# Patient Record
Sex: FEMALE | Race: WHITE | Employment: FULL TIME | URBAN - METROPOLITAN AREA
[De-identification: names, ages, dates, MRNs, and addresses within clinical notes are randomized per-mention and may not be internally consistent; named-entity substitution may affect disease eponyms.]

---

## 2018-09-21 ENCOUNTER — HOSPITAL ENCOUNTER (OUTPATIENT)
Dept: MAMMOGRAPHY | Age: 43
Discharge: HOME | End: 2018-09-23
Payer: COMMERCIAL

## 2018-09-21 DIAGNOSIS — Z12.31 VISIT FOR SCREENING MAMMOGRAM: ICD-10-CM

## 2018-09-21 PROCEDURE — 77063 BREAST TOMOSYNTHESIS BI: CPT

## 2020-01-24 ENCOUNTER — HOSPITAL ENCOUNTER (OUTPATIENT)
Dept: MAMMOGRAPHY | Age: 45
Discharge: HOME | End: 2020-01-26
Payer: COMMERCIAL

## 2020-01-24 PROCEDURE — 77063 BREAST TOMOSYNTHESIS BI: CPT

## 2020-01-28 ENCOUNTER — TELEPHONE (OUTPATIENT)
Dept: GENERAL RADIOLOGY | Age: 45
End: 2020-01-28

## 2023-03-07 LAB
FOLLITROPIN (IU/L) IN SER/PLAS: 47.7 IU/L
THYROTROPIN (MIU/L) IN SER/PLAS BY DETECTION LIMIT <= 0.05 MIU/L: 0.73 MIU/L (ref 0.44–3.98)

## 2024-06-07 ENCOUNTER — OFFICE VISIT (OUTPATIENT)
Dept: OBSTETRICS AND GYNECOLOGY | Facility: CLINIC | Age: 49
End: 2024-06-07
Payer: COMMERCIAL

## 2024-06-07 VITALS
WEIGHT: 104 LBS | HEIGHT: 64 IN | SYSTOLIC BLOOD PRESSURE: 100 MMHG | BODY MASS INDEX: 17.75 KG/M2 | DIASTOLIC BLOOD PRESSURE: 58 MMHG

## 2024-06-07 DIAGNOSIS — Z12.11 COLON CANCER SCREENING: Primary | ICD-10-CM

## 2024-06-07 DIAGNOSIS — Z01.419 WELL WOMAN EXAM: ICD-10-CM

## 2024-06-07 DIAGNOSIS — Z12.31 SCREENING MAMMOGRAM, ENCOUNTER FOR: ICD-10-CM

## 2024-06-07 PROCEDURE — 88175 CYTOPATH C/V AUTO FLUID REDO: CPT

## 2024-06-07 PROCEDURE — 1036F TOBACCO NON-USER: CPT | Performed by: OBSTETRICS & GYNECOLOGY

## 2024-06-07 PROCEDURE — 99396 PREV VISIT EST AGE 40-64: CPT | Performed by: OBSTETRICS & GYNECOLOGY

## 2024-06-07 NOTE — PROGRESS NOTES
"Karen Gay is a 49 y.o. female who is here for a routine exam. PCP = Kiley Stacy MD    Menses : pm  Contraception : other  HPV vaccine : No  Last pap : 2023 normal  Last HPV : 2023 negative  History of abnormal pap : Yes, describe: RACHEL I 2020, ascus/hpv + 2022  Last mammogram : 2023 normal  History of abnormal mammogram : Yes, describe: 2021 follow up normal  Colon cancer screen : never    ROS  systems reviewed, anything negative noted in HPI    bladder: no dysuria, gross hematuria, urinary frequency, urgency or incontinence  breast: no lumps, nipple d/c, overlying skin changes, redness, or skin retraction    [unfilled]    Past med hx and past surg hx reviewed and notable for: none    Objective   /58   Ht 1.626 m (5' 4\")   Wt 47.2 kg (104 lb)   BMI 17.85 kg/m²      General:   Alert and oriented, in no acute distress   Neck: Supple. No visible thyromegaly.    Breast/Axilla: Normal to palpation bilaterally without masses, skin changes, lymphadenopathy, or nipple discharge.    Abdomen: Soft, non-tender, without masses or organomegaly   Vulva: Normal architecture without erythema, masses, or lesions.    Vagina: Normal mucosa without lesions, masses, or atrophy. No abnormal vaginal discharge.    Cervix: Normal without masses, lesions, or signs of cervicitis.    Uterus: Normal mobile, non-enlarged uterus    Adnexa: Normal without masses or lesions   Pelvic Floor No POP noted.    Psych Normal affect. Normal mood.      Thank you for coming to your annual exam. Your findings during the exam were normal. Specific topics addressed during this exam included: healthy lifestyle, well woman screening guidelines,     Actions performed during this visit include:  - Clinical breast exam  - Clinical pelvic exam  - pap  Orders Placed This Encounter   Procedures    BI mammo bilateral screening tomosynthesis    Cologuard® colon cancer screening           Please return for your next visit in 1 year.  "

## 2024-06-20 LAB
CYTOLOGY CMNT CVX/VAG CYTO-IMP: NORMAL
LAB AP HPV GENOTYPE QUESTION: YES
LAB AP HPV HR: NORMAL
LABORATORY COMMENT REPORT: NORMAL
PATH REPORT.TOTAL CANCER: NORMAL

## 2024-06-25 ENCOUNTER — HOSPITAL ENCOUNTER (OUTPATIENT)
Dept: RADIOLOGY | Facility: CLINIC | Age: 49
Discharge: HOME | End: 2024-06-25
Payer: COMMERCIAL

## 2024-06-25 VITALS — WEIGHT: 105 LBS | BODY MASS INDEX: 17.93 KG/M2 | HEIGHT: 64 IN

## 2024-06-25 DIAGNOSIS — Z12.31 SCREENING MAMMOGRAM, ENCOUNTER FOR: ICD-10-CM

## 2024-06-25 PROCEDURE — 77067 SCR MAMMO BI INCL CAD: CPT | Performed by: RADIOLOGY

## 2024-06-25 PROCEDURE — 77067 SCR MAMMO BI INCL CAD: CPT

## 2024-06-25 PROCEDURE — 77063 BREAST TOMOSYNTHESIS BI: CPT | Performed by: RADIOLOGY

## 2025-02-07 ENCOUNTER — APPOINTMENT (OUTPATIENT)
Dept: URGENT CARE | Age: 50
End: 2025-02-07
Payer: COMMERCIAL

## 2025-05-03 ENCOUNTER — HOSPITAL ENCOUNTER (EMERGENCY)
Facility: HOSPITAL | Age: 50
Discharge: HOME | End: 2025-05-03
Payer: COMMERCIAL

## 2025-05-03 ENCOUNTER — APPOINTMENT (OUTPATIENT)
Dept: RADIOLOGY | Facility: HOSPITAL | Age: 50
End: 2025-05-03
Payer: COMMERCIAL

## 2025-05-03 VITALS
SYSTOLIC BLOOD PRESSURE: 120 MMHG | DIASTOLIC BLOOD PRESSURE: 81 MMHG | BODY MASS INDEX: 18.78 KG/M2 | OXYGEN SATURATION: 98 % | RESPIRATION RATE: 18 BRPM | TEMPERATURE: 97.5 F | HEART RATE: 94 BPM | WEIGHT: 110 LBS | HEIGHT: 64 IN

## 2025-05-03 DIAGNOSIS — S52.502A CLOSED FRACTURE OF DISTAL ENDS OF LEFT RADIUS AND ULNA, INITIAL ENCOUNTER: ICD-10-CM

## 2025-05-03 DIAGNOSIS — S52.602A CLOSED FRACTURE OF DISTAL ENDS OF LEFT RADIUS AND ULNA, INITIAL ENCOUNTER: ICD-10-CM

## 2025-05-03 DIAGNOSIS — S52.532A CLOSED COLLES' FRACTURE OF LEFT RADIUS, INITIAL ENCOUNTER: Primary | ICD-10-CM

## 2025-05-03 PROCEDURE — 2500000001 HC RX 250 WO HCPCS SELF ADMINISTERED DRUGS (ALT 637 FOR MEDICARE OP): Performed by: PHYSICIAN ASSISTANT

## 2025-05-03 PROCEDURE — 73110 X-RAY EXAM OF WRIST: CPT | Mod: LT

## 2025-05-03 PROCEDURE — 99283 EMERGENCY DEPT VISIT LOW MDM: CPT

## 2025-05-03 PROCEDURE — 73110 X-RAY EXAM OF WRIST: CPT | Mod: LEFT SIDE | Performed by: STUDENT IN AN ORGANIZED HEALTH CARE EDUCATION/TRAINING PROGRAM

## 2025-05-03 RX ORDER — HYDROCODONE BITARTRATE AND ACETAMINOPHEN 5; 325 MG/1; MG/1
1 TABLET ORAL ONCE
Refills: 0 | Status: COMPLETED | OUTPATIENT
Start: 2025-05-03 | End: 2025-05-03

## 2025-05-03 RX ORDER — HYDROCODONE BITARTRATE AND ACETAMINOPHEN 5; 325 MG/1; MG/1
1 TABLET ORAL EVERY 6 HOURS PRN
Qty: 12 TABLET | Refills: 0 | Status: SHIPPED | OUTPATIENT
Start: 2025-05-03 | End: 2025-05-06

## 2025-05-03 RX ADMIN — HYDROCODONE BITARTRATE AND ACETAMINOPHEN 1 TABLET: 5; 325 TABLET ORAL at 10:51

## 2025-05-03 ASSESSMENT — PAIN DESCRIPTION - DESCRIPTORS: DESCRIPTORS: ACHING

## 2025-05-03 ASSESSMENT — COLUMBIA-SUICIDE SEVERITY RATING SCALE - C-SSRS
6. HAVE YOU EVER DONE ANYTHING, STARTED TO DO ANYTHING, OR PREPARED TO DO ANYTHING TO END YOUR LIFE?: NO
1. IN THE PAST MONTH, HAVE YOU WISHED YOU WERE DEAD OR WISHED YOU COULD GO TO SLEEP AND NOT WAKE UP?: NO
2. HAVE YOU ACTUALLY HAD ANY THOUGHTS OF KILLING YOURSELF?: NO

## 2025-05-03 ASSESSMENT — PAIN SCALES - GENERAL: PAINLEVEL_OUTOF10: 7

## 2025-05-03 ASSESSMENT — PAIN DESCRIPTION - ORIENTATION: ORIENTATION: LEFT

## 2025-05-03 ASSESSMENT — PAIN DESCRIPTION - LOCATION: LOCATION: WRIST

## 2025-05-03 ASSESSMENT — PAIN - FUNCTIONAL ASSESSMENT: PAIN_FUNCTIONAL_ASSESSMENT: 0-10

## 2025-05-06 ENCOUNTER — TELEPHONE (OUTPATIENT)
Dept: ORTHOPEDIC SURGERY | Facility: CLINIC | Age: 50
End: 2025-05-06

## 2025-05-06 ENCOUNTER — APPOINTMENT (OUTPATIENT)
Dept: ORTHOPEDIC SURGERY | Facility: CLINIC | Age: 50
End: 2025-05-06
Payer: COMMERCIAL

## 2025-05-06 DIAGNOSIS — S52.602A CLOSED FRACTURE OF DISTAL ENDS OF LEFT RADIUS AND ULNA, INITIAL ENCOUNTER: ICD-10-CM

## 2025-05-06 DIAGNOSIS — S52.502A CLOSED FRACTURE OF DISTAL ENDS OF LEFT RADIUS AND ULNA, INITIAL ENCOUNTER: ICD-10-CM

## 2025-05-06 PROCEDURE — 99204 OFFICE O/P NEW MOD 45 MIN: CPT | Performed by: ORTHOPAEDIC SURGERY

## 2025-05-06 PROCEDURE — 99214 OFFICE O/P EST MOD 30 MIN: CPT | Mod: 57 | Performed by: ORTHOPAEDIC SURGERY

## 2025-05-07 ENCOUNTER — TELEPHONE (OUTPATIENT)
Dept: ORTHOPEDIC SURGERY | Facility: CLINIC | Age: 50
End: 2025-05-07
Payer: COMMERCIAL

## 2025-05-07 DIAGNOSIS — M25.539 PAIN IN WRIST, UNSPECIFIED LATERALITY: Primary | ICD-10-CM

## 2025-05-07 RX ORDER — OXYCODONE AND ACETAMINOPHEN 5; 325 MG/1; MG/1
1 TABLET ORAL EVERY 4 HOURS PRN
Qty: 28 TABLET | Refills: 0 | Status: SHIPPED | OUTPATIENT
Start: 2025-05-07 | End: 2025-05-14

## 2025-05-07 NOTE — PROGRESS NOTES
Patient presents with a left wrist fracture she fell the other day and placed in a splint she having moderate discomfort no numbness or tingling she is in good general health  See intake sheet which was reviewed and scanned into the chart  Constitutional: Well-developed well-nourished   Eyes: Sclerae anicteric, pupils equal and round  HENT: Normocephalic atraumatic  Cardiovascular: Pulses full, regular rate and rhythm  Respiratory: Breathing not labored, no wheezing  Integumentary: Skin intact, no lesions or rashes  Neurological: Sensation intact, no gross strength deficits, reflexes equal  Psychiatric: Alert oriented and appropriate  Hematologic/lymphatic: No lymphadenopathy  Left wrist appropriately immobilized digits are mobile and sensate with satisfactory capillary refill  X-rays are personally interpreted comminuted distal radius fracture which is dorsally angulated      Impression distal radius fracture  Plan we discussed treatment options closed treatment versus open reduction internal fixation given her age and fracture position I recommend proceeding with open duction internal fixation  Risks, benefits, goals of surgery as well as alternative treatments were discussed.   Realistic post operative expectations and expected course of recovery were reviewed.  All questions answered.

## 2025-05-07 NOTE — TELEPHONE ENCOUNTER
This patient sent a Koru message and she is ready to schedule her surgery.  Can we place a Case request for surgery for this patient?

## 2025-05-08 ENCOUNTER — TELEPHONE (OUTPATIENT)
Dept: PREADMISSION TESTING | Facility: HOSPITAL | Age: 50
End: 2025-05-08
Payer: COMMERCIAL

## 2025-05-08 PROBLEM — S52.602A CLOSED FRACTURE OF LEFT DISTAL RADIUS AND ULNA: Status: ACTIVE | Noted: 2025-05-06

## 2025-05-08 PROBLEM — S52.502A CLOSED FRACTURE OF LEFT DISTAL RADIUS AND ULNA: Status: ACTIVE | Noted: 2025-05-06

## 2025-05-09 ENCOUNTER — APPOINTMENT (OUTPATIENT)
Dept: LAB | Facility: HOSPITAL | Age: 50
End: 2025-05-09
Payer: COMMERCIAL

## 2025-05-09 ENCOUNTER — CLINICAL SUPPORT (OUTPATIENT)
Dept: PREADMISSION TESTING | Facility: HOSPITAL | Age: 50
End: 2025-05-09
Payer: COMMERCIAL

## 2025-05-09 ENCOUNTER — PRE-ADMISSION TESTING (OUTPATIENT)
Dept: PREADMISSION TESTING | Facility: HOSPITAL | Age: 50
End: 2025-05-09
Payer: COMMERCIAL

## 2025-05-09 VITALS
RESPIRATION RATE: 18 BRPM | TEMPERATURE: 97 F | DIASTOLIC BLOOD PRESSURE: 75 MMHG | OXYGEN SATURATION: 94 % | HEART RATE: 80 BPM | WEIGHT: 108.03 LBS | SYSTOLIC BLOOD PRESSURE: 119 MMHG | BODY MASS INDEX: 18.44 KG/M2 | HEIGHT: 64 IN

## 2025-05-09 DIAGNOSIS — Z01.818 PREOP TESTING: Primary | ICD-10-CM

## 2025-05-09 LAB
ANION GAP SERPL CALC-SCNC: 13 MMOL/L (ref 10–20)
BASOPHILS # BLD AUTO: 0.03 X10*3/UL (ref 0–0.1)
BASOPHILS NFR BLD AUTO: 0.6 %
BUN SERPL-MCNC: 15 MG/DL (ref 6–23)
CALCIUM SERPL-MCNC: 10.5 MG/DL (ref 8.6–10.3)
CHLORIDE SERPL-SCNC: 83 MMOL/L (ref 98–107)
CO2 SERPL-SCNC: 39 MMOL/L (ref 21–32)
CREAT SERPL-MCNC: 0.84 MG/DL (ref 0.5–1.05)
EGFRCR SERPLBLD CKD-EPI 2021: 86 ML/MIN/1.73M*2
EOSINOPHIL # BLD AUTO: 0.01 X10*3/UL (ref 0–0.7)
EOSINOPHIL NFR BLD AUTO: 0.2 %
ERYTHROCYTE [DISTWIDTH] IN BLOOD BY AUTOMATED COUNT: 11.1 % (ref 11.5–14.5)
GLUCOSE SERPL-MCNC: 116 MG/DL (ref 74–99)
HCT VFR BLD AUTO: 39.5 % (ref 36–46)
HGB BLD-MCNC: 14.3 G/DL (ref 12–16)
IMM GRANULOCYTES # BLD AUTO: 0.01 X10*3/UL (ref 0–0.7)
IMM GRANULOCYTES NFR BLD AUTO: 0.2 % (ref 0–0.9)
LYMPHOCYTES # BLD AUTO: 1.96 X10*3/UL (ref 1.2–4.8)
LYMPHOCYTES NFR BLD AUTO: 36.8 %
MCH RBC QN AUTO: 33.2 PG (ref 26–34)
MCHC RBC AUTO-ENTMCNC: 36.2 G/DL (ref 32–36)
MCV RBC AUTO: 92 FL (ref 80–100)
MONOCYTES # BLD AUTO: 0.74 X10*3/UL (ref 0.1–1)
MONOCYTES NFR BLD AUTO: 13.9 %
NEUTROPHILS # BLD AUTO: 2.57 X10*3/UL (ref 1.2–7.7)
NEUTROPHILS NFR BLD AUTO: 48.3 %
NRBC BLD-RTO: 0 /100 WBCS (ref 0–0)
PLATELET # BLD AUTO: 290 X10*3/UL (ref 150–450)
POTASSIUM SERPL-SCNC: 3.4 MMOL/L (ref 3.5–5.3)
RBC # BLD AUTO: 4.31 X10*6/UL (ref 4–5.2)
SODIUM SERPL-SCNC: 132 MMOL/L (ref 136–145)
WBC # BLD AUTO: 5.3 X10*3/UL (ref 4.4–11.3)

## 2025-05-09 PROCEDURE — 85025 COMPLETE CBC W/AUTO DIFF WBC: CPT

## 2025-05-09 PROCEDURE — 87081 CULTURE SCREEN ONLY: CPT | Mod: AHULAB | Performed by: PHYSICIAN ASSISTANT

## 2025-05-09 PROCEDURE — 80048 BASIC METABOLIC PNL TOTAL CA: CPT

## 2025-05-09 PROCEDURE — 99204 OFFICE O/P NEW MOD 45 MIN: CPT | Performed by: PHYSICIAN ASSISTANT

## 2025-05-09 RX ORDER — BISMUTH SUBSALICYLATE 262 MG
1 TABLET,CHEWABLE ORAL DAILY
COMMUNITY

## 2025-05-09 RX ORDER — NICOTINE POLACRILEX 2 MG
1 GUM BUCCAL DAILY
COMMUNITY

## 2025-05-09 RX ORDER — CHLORHEXIDINE GLUCONATE ORAL RINSE 1.2 MG/ML
SOLUTION DENTAL
Qty: 473 ML | Refills: 0 | Status: SHIPPED | OUTPATIENT
Start: 2025-05-09

## 2025-05-09 ASSESSMENT — ENCOUNTER SYMPTOMS
ENDOCRINE NEGATIVE: 1
CONSTITUTIONAL NEGATIVE: 1
RESPIRATORY NEGATIVE: 1
CARDIOVASCULAR NEGATIVE: 1
PSYCHIATRIC NEGATIVE: 1
MUSCULOSKELETAL NEGATIVE: 1
ALLERGIC/IMMUNOLOGIC NEGATIVE: 1
NEUROLOGICAL NEGATIVE: 1
EYES NEGATIVE: 1
GASTROINTESTINAL NEGATIVE: 1
HEMATOLOGIC/LYMPHATIC NEGATIVE: 1

## 2025-05-09 NOTE — CPM/PAT H&P
[facility] Evaluation       Name: Karen Gay (Karen Gay)  /Age: 1975/49 y.o.     In-Person       Chief Complaint: Closed fracture of distal ends of left radius and ulna, initial encounter [S52.502A, S52.602A]     HPI      Date of Consult: 25    Referring Provider: Dr. Arthur     Surgery, Date, and Length: Left Wrist Fracture Open Reduction Internal Fixation; 25; 90 minutes     Karen Gay is a 49 year-old female who [facility] perioperative risk assessment prior to surgery. She was walking the dog this was mechanical fall  injuring wrist.   Xray 5/3: 1. Acute comminuted impacted and angulated fracture through distal  radial metadiaphysis with findings concerning for intra-articular  extension to the distal radial articular surface as described above.  2. Minimally displaced fracture through base of the ulnar styloid process.  3. Diffuse soft tissue swelling about the wrist.      This note was created in part upon personal review of patient's medical records.      Patient is scheduled to have Left Wrist Fracture Open Reduction Internal Fixation     Medical History  Past Medical History:   Diagnosis Date    H/O hypokalemia             Surgical History  Past Surgical History:   Procedure Laterality Date    COLPOSCOPY               Family history:  Family History   Problem Relation Name Age of Onset    No Known Problems Mother      Heart attack Father      Prostate cancer Father      No Known Problems Sister      Breast cancer Father's Sister          Social history:  Social History     Socioeconomic History    Marital status: Single     Spouse name: Not on file    Number of children: Not on file    Years of education: Not on file    Highest education level: Not on file   Occupational History    Not on file   Tobacco Use    Smoking status: Never    Smokeless tobacco: Never   Vaping Use    Vaping status: Never Used   Substance and Sexual Activity    Alcohol use: Yes      "Alcohol/week: 8.0 standard drinks of alcohol     Types: 8 Standard drinks or equivalent per week    Drug use: Never    Sexual activity: Not on file   Other Topics Concern    Not on file   Social History Narrative    Not on file     Social Drivers of Health     Financial Resource Strain: Not on file   Food Insecurity: Not on file   Transportation Needs: Not on file   Physical Activity: Not on file   Stress: Not on file   Social Connections: Not on file   Intimate Partner Violence: Not on file   Housing Stability: Not on file        Current Outpatient Medications   Medication Instructions    biotin 1 mg, oral, Daily    chlorhexidine (Peridex) 0.12 % solution 15 ml swish and spit for 30 seconds night prior to surgery and morning of surgery    multivitamin tablet 1 tablet, Daily    oxyCODONE-acetaminophen (Percocet) 5-325 mg tablet 1 tablet, oral, Every 4 hours PRN          Visit Vitals  /75   Pulse 80   Temp 36.1 °C (97 °F)   Resp 18   Ht 1.62 m (5' 3.78\")   Wt 49 kg (108 lb 0.4 oz)   SpO2 94%   BMI 18.67 kg/m²   OB Status Postmenopausal   Smoking Status Never   BSA 1.48 m²           Review of Systems   Constitutional: Negative.    HENT: Negative.     Eyes: Negative.         Glasses   Respiratory: Negative.     Cardiovascular: Negative.         METS >4   regular exercise /  Without chest pain / SOB    Gastrointestinal: Negative.    Endocrine: Negative.    Genitourinary: Negative.    Musculoskeletal: Negative.         Left wrist fx   Skin: Negative.    Allergic/Immunologic: Negative.    Neurological: Negative.    Hematological: Negative.    Psychiatric/Behavioral: Negative.          Physical Exam  Vitals reviewed.   Constitutional:       Appearance: Normal appearance.   HENT:      Head: Normocephalic and atraumatic.      Right Ear: External ear normal.      Left Ear: External ear normal.      Nose: Nose normal.      Mouth/Throat:      Pharynx: Oropharynx is clear.   Eyes:      Extraocular Movements: " Extraocular movements intact.      Conjunctiva/sclera: Conjunctivae normal.      Pupils: Pupils are equal, round, and reactive to light.   Cardiovascular:      Rate and Rhythm: Normal rate and regular rhythm.      Heart sounds: Normal heart sounds.   Pulmonary:      Effort: Pulmonary effort is normal.      Breath sounds: Normal breath sounds.   Abdominal:      Palpations: Abdomen is soft.   Musculoskeletal:         General: Normal range of motion.      Cervical back: Normal range of motion and neck supple.      Comments: Left wrist splint    Skin:     General: Skin is warm and dry.   Neurological:      General: No focal deficit present.      Mental Status: She is alert and oriented to person, place, and time.   Psychiatric:         Mood and Affect: Mood normal.         Behavior: Behavior normal.          PAT AIRWAY:   Airway:     Mallampati::  I    Neck ROM::  Full    Lab Results   Component Value Date    WBC 5.3 05/09/2025    HGB 14.3 05/09/2025    HCT 39.5 05/09/2025    MCV 92 05/09/2025     05/09/2025      Lab Results   Component Value Date    GLUCOSE 116 (H) 05/09/2025    CALCIUM 10.5 (H) 05/09/2025     (L) 05/09/2025    K 3.4 (L) 05/09/2025    CO2 39 (H) 05/09/2025    CL 83 (L) 05/09/2025    BUN 15 05/09/2025    CREATININE 0.84 05/09/2025        Patient is scheduled to have Left Wrist Fracture Open Reduction Internal Fixation     Cardiovascular  METS: >4   RCRI: 0  , 3.9 % Risk of MACE  Caprini: 3    Pulmonary:  Stop Bang score is 0 placing patient at low risk for VINEET  ARISCAT: <26 points, 1.6% risk of in-hospital postoperative pulmonary complication  PRODIGY: Low risk for opioid induced respiratory depression     Hematology       Patient instructed to ambulate as soon as possible postoperatively to decrease thromboembolic risk.      Initiate mechanical DVT prophylaxis as soon as possible and initiate chemical prophylaxis when deemed safe from a bleeding standpoint post surgery.       VTE  prophylaxis per surgical team       Tests ordered in PAT: cbc, bmp, mrsa   LABS REVIEWED: unremarkable     Risk assessment complete.  Patient is scheduled for a low surgical risk procedure.        Preoperative medication instructions were provided and reviewed with the patient.  Any additional testing or evaluation was explained to the patient.  Nothing by mouth instructions were discussed and patient's questions were answered prior to conclusion to this encounter.  Patient verbalized understanding of preoperative instructions given in preadmission testing; discharge instructions available in EMR.

## 2025-05-09 NOTE — CPM/PAT NURSE NOTE
CPM/PAT Nurse Note      Name: Karen Gay (Karen Gay)  /Age: 1975/49 y.o.       Medical History[1]    Surgical History[2]    Patient  has no history on file for sexual activity.    Family History[3]    Allergies[4]    Prior to Admission medications    Medication Sig Start Date End Date Taking? Authorizing Provider   biotin 1 mg capsule Take 1 capsule (1 mg) by mouth once daily.    Historical Provider, MD   HYDROcodone-acetaminophen (Norco) 5-325 mg tablet Take 1 tablet by mouth every 6 hours if needed for severe pain (7 - 10) for up to 3 days. 5/3/25 5/6/25  Humza Uribe PA-C   oxyCODONE-acetaminophen (Percocet) 5-325 mg tablet Take 1 tablet by mouth every 4 hours if needed for severe pain (7 - 10) for up to 7 days. 25  MD KANDY Santamaria ROS     DASI Risk Score    No data to display       Caprini DVT Assessment    No data to display       Modified Frailty Index    No data to display       [id number] Stroke Risk Points  Current as of just now        N/A 0 to 9 Points:      Last Change: N/A          The IIX4MP9-OTNw risk score (Lip GH, et al. 2009. © 2010 [employer]) quantifies the risk of stroke for a patient with atrial fibrillation. For patients without atrial fibrillation or under the age of 18 this score appears as N/A. Higher score values generally indicate higher risk of stroke.        This score is not applicable to this patient. Components are not calculated.          Revised Cardiac Risk Index    No data to display       Apfel Simplified Score    No data to display       Risk Analysis Index Results This Encounter    No data found in the last 10 encounters.       Prodigy: High Risk  Total Score: 3              Prodigy Previous Opioid Use Score           ARISCAT Score for Postoperative Pulmonary Complications    No data to display       Salvador Perioperative Risk for Myocardial Infarction or Cardiac Arrest (JOANNA)    No data to display          Nurse Plan of Action:   RN screening call complete.  Reviewed allergies, medications and pharmacy, medical, surgical and social history with patient.  Chart updated.               [1]   Past Medical History:  Diagnosis Date    Personal history of other endocrine, nutritional and metabolic disease 11/23/2016    History of hypokalemia   [2]   Past Surgical History:  Procedure Laterality Date    OTHER SURGICAL HISTORY  08/17/2021    Colposcopy   [3]   Family History  Problem Relation Name Age of Onset    No Known Problems Mother      Heart attack Father      Prostate cancer Father      No Known Problems Sister      Breast cancer Father's Sister     [4]   Allergies  Allergen Reactions    Pseudoephedrine Hcl Other     Causes anxiety

## 2025-05-09 NOTE — PREPROCEDURE INSTRUCTIONS
Medication List            Accurate as of May 9, 2025  2:04 PM. Always use your most recent med list.                biotin 1 mg capsule  Medication Adjustments for Surgery: Do Not take on the morning of surgery     multivitamin tablet  Notes to patient: HOLD 7 Days prior to surgery - Last dose 5/5 Stop NOW if not already stopped      oxyCODONE-acetaminophen 5-325 mg tablet  Commonly known as: Percocet  Take 1 tablet by mouth every 4 hours if needed for severe pain (7 - 10) for up to 7 days.  Medication Adjustments for Surgery: Take/Use as prescribed                 Concerning above medication instructions - If medication is normally taken at night continue normal schedule - do not take night prior and morning of surgery.       Preoperative Deep Breathing Exercises  Why it is important to do deep breathing exercises before my surgery?  Deep breathing exercises strengthen your breathing muscles.  This helps you to recover after your surgery and decreases the chance of breathing complications.  How are the deep breathing exercises done?  Sit straight with your back supported.  Breathe in deeply and slowly through your nose. Your lower rib cage should expand and your abdomen may move forward.  Hold that breath for 3 to 5 seconds.  Breathe out through pursed lips, slowly and completely.  Rest and repeat 10 times every hour while awake.  Rest longer if you become dizzy or lightheaded.      CONTACT SURGEON'S OFFICE IF YOU DEVELOP:  * Fever = 100.4 F   * New respiratory symptoms (e.g. cough, shortness of breath, respiratory distress, sore throat)  * Recent loss of taste or smell  *Flu like symptoms such as headache, fatigue or gastrointestinal symptoms  * You develop any open sores, shingles, burning or painful urination   AND/OR:  * You no longer wish to have the surgery.  * Any other personal circumstances change that may lead to the need to cancel or defer this surgery.  *You were admitted to any hospital within one  week of your planned procedure.    SMOKING:  *Quitting smoking can make a huge difference to your health and recovery from surgery.    *If you need help with quitting, call 4-022-QUIT-NOW.    THE DAY OF SURGERY:  *Do not eat any food after midnight the night before surgery/procedure.   *YOU MUST DRINK 14 oz drink clear liquids (i.e. water, black coffee/tea, (no milk or cream) apple juice, and electrolyte drinks (Gatorade)  2 hours before your instructed arrival time to the hospital.  *You may chew gum until  2 hours before your surgery/procedure.    SURGICAL TIME  *You will be contacted between 2 p.m. and 6 p.m. the business day before your surgery with your arrival time.  *If you haven't received a call by 6pm, call [phone].  *Scheduled surgery times may change and you will be notified if this occurs-check your personal voicemail for any updates.    ON THE MORNING OF SURGERY:  *Wear comfortable, loose fitting clothing.   *Do not use moisturizers, creams, lotions or perfume.  *All jewelry and valuables should be left at home.  *Prosthetic devices such as contact lenses, hearing aids, dentures, eyelash extensions, hairpins and body piercing must be removed before surgery.    BRING WITH YOU:  *Photo ID and insurance card  *Current list of medicines and allergies  *Pacemaker/Defibrillator/Heart stent cards  *CPAP machine and mask  *Slings/splints/crutches  *Copy of your complete Advanced Directive/DHPOA-if applicable  *Neurostimulator implant remote    PARKING AND ARRIVAL:  *Check in at the Main Entrance desk and let them know you are here for surgery.  *You will be directed to the 2nd floor surgical waiting area.    AFTER OUTPATIENT SURGERY:  *A responsible adult MUST accompany you at the time of discharge and stay with you for 24 hours after your surgery.  *You may NOT drive yourself home after surgery.  *You may use a taxi or ride sharing service (Lyft, Uber) to return home ONLY if you are accompanied by a  friend or family member.  *Instructions for resuming your medications will be provided by your surgeon.      Home Preoperative Antibacterial Shower     What is a home preoperative antibacterial shower?  This shower is a way of cleaning the skin with a germ killing soap before surgery.  The soap contains chlorhexidine, commonly known as CHG.  CHG is a soap for your skin with germ killing ability.  Let your doctor know if you are allergic to chlorhexidine.    Why do I need to take a preoperative antibacterial shower?  Skin is not sterile.  It is best to try to make your skin as free of germs as possible before surgery.  Proper cleansing with a germ killing soap before surgery can lower the number of germs on your skin.  This helps to reduce the risk of infection at the surgical site.  Following the instructions listed below will help you prepare your skin for surgery.      How do I use the CHG skin cleanser?  Steps:  Begin using your CHG soap five days before your scheduled surgery on ________________________.    Days 1-4 Shower before bed:  Wash your face and genitals with your normal soap and rinse.           2.    Apply the CHG soap to a clean wet washcloth.  Turn the water off or move away                From the water spray to avoid premature rinsing of the CHG soap as you are applying.     3.   Lather your entire body from the neck down.  Do not use on your face or genitals.  4. Pay special attention to the area(s) where your incision(s) will be located unless they are on your face.  Avoid scrubbing your skin too hard.  The important point is to have the CHG soap sit on your skin for 3 minutes.    When the 3 minutes are up, turn on the water and rinse the CHG soap off your body completely.   Pat yourself dry with a clean, freshly-laundered towel.  Dress in clean, freshly laundered night clothes.    Be sure to change bed sheets and blankets at least on the first night of CHG body wash use. May change linens every  night of the above protocol for maximum benefit.   Day 5:  Last shower is the morning of surgery: Follow above Instructions.    NOTE:        *Keep CHG soap out of eyes and ear canals   *DO NOT wash with regular soap on your body after you have used the CHG soap solution  *DO NOT apply powders, lotions, or perfume.  *Deodorant may be used days 1-4, BUT NOT the day of surgery    Who should I contact if I have any questions regarding the use of CHG soap?  Call the [facility], Preadmission Testing at [phone] if you have any questions.              Patient Information: Pre-Operative Infection Prevention Measures     Why did I have my nose, under my arms and groin swabbed?  The purpose of the swab is to identify Staphylococcus aureus inside your nose or on your skin.  The swab was sent to the laboratory for culture.  A positive swab/culture for Staphylococcus aureus is called colonization or carriage.      What is Staphylococcus aureus?  Staphylococcus aureus, also known as “staph”, is a germ found on the skin or in the nose of healthy people.  Sometimes Staphylococcus aureus can get into the body and cause an infection.  This can be minor (such as pimples, boils or other skin problems).  It might also be serious (such as blood infection, pneumonia or a surgical site infection).    What is Staphylococcus aureus colonization or carriage?  Colonization or carriage means that a person has the germ but is not sick from it.  These bacteria can be spread on the hands or when breathing or sneezing.    How is Staphylococcus aureus spread?  It is most often spread by close contact with a person or item that carries it.    What happens if my culture is positive for Staphylococcus aureus?  Your doctor/medical team will use this information to guide any antibiotic treatment which may be necessary.  Regardless of the culture results, we will clean the inside of your nose with a betadine swab just  before you have your surgery.      Will I get an infection if I have Staphylococcus aureus in my nose or on my skin?  Anyone can get an infection with Staphylococcus aureus.  However, the best way to reduce your risk of infection is to follow the instructions provided to you for the use of your CHG soap and dental rinse.        Who should I contact if I have any questions?  Call the [facility], Preadmission Testing at [phone] if you have any questions.          Patient Information: Oral/Dental Rinse  **This is a prescription; pick it up at your preferred local pharmacy **  What is oral/dental rinse?   It is a mouthwash. It is a way of cleaning the mouth with a germ killing solution before your surgery.  The solution contains chlorhexidine, commonly known as CHG.   It is used inside the mouth to kill a bacteria known as Staphylococcus aureus.  Let your doctor know if you are allergic to Chlorhexidine.    Why do I need to use CHG oral/dental rinse?  The CHG oral/dental rinse helps to kill a bacteria in your mouth known a Staphylococcus aureus.     This reduces the risk of infection at the surgical site.      Using your CHG oral/dental rinse  STEPS:  Use your CHG oral/dental rinse after you brush your teeth the night before (at bedtime) and the morning of your surgery.  Follow all directions on your prescription label.    Use the cap on the container to measure 15ml (fill cap to fill line)  Swish (gargle if you can) the mouthwash in your mouth for at least 30 seconds, (do not to swallow) spit out  After you use your CHG rinse, do not rinse your mouth with water, drink or eat.  Please refer to prescription label for the appropriate time to resume oral intake  Dental rinse comes in one size bottle: 473ml ~16oz.  You will have leftover    rinse, discard after this use.    What side effects might I have using the CHG oral/dental rinse?  CHG rinse will stick to plaque on the teeth.  Brush  and floss just before use.  Teeth brushing will help avoid staining of plaque during use.    Who should I contact if I have questions about the CHG oral/dental rinse?  Please call [facility], Preadmission Testing at [phone] if you have any questions           Patient Information: Oral/Dental Rinse  **This is a prescription; pick it up at your preferred local pharmacy **  What is oral/dental rinse?   It is a mouthwash. It is a way of cleaning the mouth with a germ killing solution before your surgery.  The solution contains chlorhexidine, commonly known as CHG.   It is used inside the mouth to kill a bacteria known as Staphylococcus aureus.  Let your doctor know if you are allergic to Chlorhexidine.    Why do I need to use CHG oral/dental rinse?  The CHG oral/dental rinse helps to kill a bacteria in your mouth known a Staphylococcus aureus.     This reduces the risk of infection at the surgical site.      Using your CHG oral/dental rinse  STEPS:  Use your CHG oral/dental rinse after you brush your teeth the night before (at bedtime) and the morning of your surgery.  Follow all directions on your prescription label.    Use the cap on the container to measure 15ml (fill cap to fill line)  Swish (gargle if you can) the mouthwash in your mouth for at least 30 seconds, (do not to swallow) spit out  After you use your CHG rinse, do not rinse your mouth with water, drink or eat.  Please refer to prescription label for the appropriate time to resume oral intake  Dental rinse comes in one size bottle: 473ml ~16oz.  You will have leftover    rinse, discard after this use.    What side effects might I have using the CHG oral/dental rinse?  CHG rinse will stick to plaque on the teeth.  Brush and floss just before use.  Teeth brushing will help avoid staining of plaque during use.    Who should I contact if I have questions about the CHG oral/dental rinse?  Please call [facility]  [facility], Preadmission Testing at [phone] if you have any questions

## 2025-05-09 NOTE — H&P (VIEW-ONLY)
[facility]       Name: Karen Gay (Karen Gay)  /Age: 1975/49 y.o.     In-Person       Chief Complaint: Closed fracture of distal ends of left radius and ulna, initial encounter [S52.502A, S52.602A]     HPI      Date of Consult: 25    Referring Provider: Dr. Arthur     Surgery, Date, and Length: Left Wrist Fracture Open Reduction Internal Fixation; 25; 90 minutes     Karen Gay is a 49 year-old female who presents to the [facility] for perioperative risk assessment prior to surgery. She was walking the dog this was mechanical fall  injuring wrist.   Xray 5/3: 1. Acute comminuted impacted and angulated fracture through distal  radial metadiaphysis with findings concerning for intra-articular  extension to the distal radial articular surface as described above.  2. Minimally displaced fracture through base of the ulnar styloid process.  3. Diffuse soft tissue swelling about the wrist.      This note was created in part upon personal review of patient's medical records.      Patient is scheduled to have Left Wrist Fracture Open Reduction Internal Fixation     Medical History  Past Medical History:   Diagnosis Date    H/O hypokalemia             Surgical History  Past Surgical History:   Procedure Laterality Date    COLPOSCOPY               Family history:  Family History   Problem Relation Name Age of Onset    No Known Problems Mother      Heart attack Father      Prostate cancer Father      No Known Problems Sister      Breast cancer Father's Sister          Social history:  Social History     Socioeconomic History    Marital status: Single     Spouse name: Not on file    Number of children: Not on file    Years of education: Not on file    Highest education level: Not on file   Occupational History    Not on file   Tobacco Use    Smoking status: Never    Smokeless tobacco: Never   Vaping Use    Vaping status: Never Used   Substance and Sexual Activity    Alcohol use: Yes      "Alcohol/week: 8.0 standard drinks of alcohol     Types: 8 Standard drinks or equivalent per week    Drug use: Never    Sexual activity: Not on file   Other Topics Concern    Not on file   Social History Narrative    Not on file     Social Drivers of Health     Financial Resource Strain: Not on file   Food Insecurity: Not on file   Transportation Needs: Not on file   Physical Activity: Not on file   Stress: Not on file   Social Connections: Not on file   Intimate Partner Violence: Not on file   Housing Stability: Not on file        Current Outpatient Medications   Medication Instructions    biotin 1 mg, oral, Daily    chlorhexidine (Peridex) 0.12 % solution 15 ml swish and spit for 30 seconds night prior to surgery and morning of surgery    multivitamin tablet 1 tablet, Daily    oxyCODONE-acetaminophen (Percocet) 5-325 mg tablet 1 tablet, oral, Every 4 hours PRN          Visit Vitals  /75   Pulse 80   Temp 36.1 °C (97 °F)   Resp 18   Ht 1.62 m (5' 3.78\")   Wt 49 kg (108 lb 0.4 oz)   SpO2 94%   BMI 18.67 kg/m²   OB Status Postmenopausal   Smoking Status Never   BSA 1.48 m²           Review of Systems   Constitutional: Negative.    HENT: Negative.     Eyes: Negative.         Glasses   Respiratory: Negative.     Cardiovascular: Negative.         METS >4   regular exercise /  Without chest pain / SOB    Gastrointestinal: Negative.    Endocrine: Negative.    Genitourinary: Negative.    Musculoskeletal: Negative.         Left wrist fx   Skin: Negative.    Allergic/Immunologic: Negative.    Neurological: Negative.    Hematological: Negative.    Psychiatric/Behavioral: Negative.          Physical Exam  Vitals reviewed.   Constitutional:       Appearance: Normal appearance.   HENT:      Head: Normocephalic and atraumatic.      Right Ear: External ear normal.      Left Ear: External ear normal.      Nose: Nose normal.      Mouth/Throat:      Pharynx: Oropharynx is clear.   Eyes:      Extraocular Movements: " Extraocular movements intact.      Conjunctiva/sclera: Conjunctivae normal.      Pupils: Pupils are equal, round, and reactive to light.   Cardiovascular:      Rate and Rhythm: Normal rate and regular rhythm.      Heart sounds: Normal heart sounds.   Pulmonary:      Effort: Pulmonary effort is normal.      Breath sounds: Normal breath sounds.   Abdominal:      Palpations: Abdomen is soft.   Musculoskeletal:         General: Normal range of motion.      Cervical back: Normal range of motion and neck supple.      Comments: Left wrist splint    Skin:     General: Skin is warm and dry.   Neurological:      General: No focal deficit present.      Mental Status: She is alert and oriented to person, place, and time.   Psychiatric:         Mood and Affect: Mood normal.         Behavior: Behavior normal.          PAT AIRWAY:   Airway:     Mallampati::  I    Neck ROM::  Full    Lab Results   Component Value Date    WBC 5.3 05/09/2025    HGB 14.3 05/09/2025    HCT 39.5 05/09/2025    MCV 92 05/09/2025     05/09/2025      Lab Results   Component Value Date    GLUCOSE 116 (H) 05/09/2025    CALCIUM 10.5 (H) 05/09/2025     (L) 05/09/2025    K 3.4 (L) 05/09/2025    CO2 39 (H) 05/09/2025    CL 83 (L) 05/09/2025    BUN 15 05/09/2025    CREATININE 0.84 05/09/2025        Patient is scheduled to have Left Wrist Fracture Open Reduction Internal Fixation     Cardiovascular  METS: >4   RCRI: 0  , 3.9 % Risk of MACE  Caprini: 3    Pulmonary:  Stop Bang score is 0 placing patient at low risk for VINEET  ARISCAT: <26 points, 1.6% risk of in-hospital postoperative pulmonary complication  PRODIGY: Low risk for opioid induced respiratory depression     Hematology       Patient instructed to ambulate as soon as possible postoperatively to decrease thromboembolic risk.      Initiate mechanical DVT prophylaxis as soon as possible and initiate chemical prophylaxis when deemed safe from a bleeding standpoint post surgery.       VTE  prophylaxis per surgical team       Tests ordered in PAT: cbc, bmp, mrsa   LABS REVIEWED: unremarkable     Risk assessment complete.  Patient is scheduled for a low surgical risk procedure.        Preoperative medication instructions were provided and reviewed with the patient.  Any additional testing or evaluation was explained to the patient.  Nothing by mouth instructions were discussed and patient's questions were answered prior to conclusion to this encounter.  Patient verbalized understanding of preoperative instructions given in preadmission testing; discharge instructions available in EMR.

## 2025-05-09 NOTE — CPM/PAT NURSE NOTE
CPM/PAT Nurse Note      Name: Karen Gay (Karen Gay)  /Age: 1975/49 y.o.       Medical History[1]    Surgical History[2]    Patient  has no history on file for sexual activity.    Family History[3]    Allergies[4]    Prior to Admission medications    Medication Sig Start Date End Date Taking? Authorizing Provider   multivitamin tablet Take 1 tablet by mouth once daily.   Yes Historical Provider, MD   oxyCODONE-acetaminophen (Percocet) 5-325 mg tablet Take 1 tablet by mouth every 4 hours if needed for severe pain (7 - 10) for up to 7 days. 25 Yes Emanuel Arthur MD   biotin 1 mg capsule Take 1 capsule (1 mg) by mouth once daily.    Historical Provider, MD   chlorhexidine (Peridex) 0.12 % solution 15 ml swish and spit for 30 seconds night prior to surgery and morning of surgery 25   Karli Chandra PA-C   HYDROcodone-acetaminophen (Norco) 5-325 mg tablet Take 1 tablet by mouth every 6 hours if needed for severe pain (7 - 10) for up to 3 days.  Patient not taking: Reported on 2025 5/3/25 5/6/25  DESTINEY Ramirez     DASI Risk Score    No data to display       Caprini DVT Assessment    No data to display       Modified Frailty Index    No data to display       [id number] Stroke Risk Points  Current as of just now        N/A 0 to 9 Points:      Last Change: N/A          The ENC8WW5-VWVk risk score (Lip RAUL, et al. 2009. © 2010 [employer]) quantifies the risk of stroke for a patient with atrial fibrillation. For patients without atrial fibrillation or under the age of 18 this score appears as N/A. Higher score values generally indicate higher risk of stroke.        This score is not applicable to this patient. Components are not calculated.          Revised Cardiac Risk Index    No data to display       Apfel Simplified Score    No data to display       Risk Analysis Index Results This Encounter    No data found in the last 10  encounters.       Prodigy: High Risk  Total Score: 3              Prodigy Previous Opioid Use Score           ARISCAT Score for Postoperative Pulmonary Complications    No data to display       Salvador Perioperative Risk for Myocardial Infarction or Cardiac Arrest (JOANNA)    No data to display         Nurse Plan of Action:       After Visit Summary (AVS) reviewed and patient verbalized good understanding of medications and NPO instructions.  Pre-op infection prevention measures:  CHG showers and mouthwash reviewed, understanding voiced.  CHG soap given and patient verbalized need to pick CHG mouthwash at their preferred local pharmacy.            [1]   Past Medical History:  Diagnosis Date    H/O hypokalemia    [2]   Past Surgical History:  Procedure Laterality Date    COLPOSCOPY     [3]   Family History  Problem Relation Name Age of Onset    No Known Problems Mother      Heart attack Father      Prostate cancer Father      No Known Problems Sister      Breast cancer Father's Sister     [4]   Allergies  Allergen Reactions    Pseudoephedrine Hcl Other     Causes anxiety

## 2025-05-11 LAB — STAPHYLOCOCCUS SPEC CULT: ABNORMAL

## 2025-05-12 ENCOUNTER — ANESTHESIA EVENT (OUTPATIENT)
Dept: OPERATING ROOM | Facility: HOSPITAL | Age: 50
End: 2025-05-12
Payer: COMMERCIAL

## 2025-05-12 PROBLEM — E87.1 HYPONATREMIA: Status: ACTIVE | Noted: 2025-05-12

## 2025-05-12 NOTE — ANESTHESIA PREPROCEDURE EVALUATION
Pre-Anesthesia Evaluation      Karen Gay is a 49 y.o. female who presents for the above mentioned procedure due to Closed fracture of distal ends of left radius and ulna, initial encounter [S52.094A, S58.554K]       Medical History[1]  Surgical History[2]  Social History[3]  RX Allergies[4]  Current Medications[5]  Prior to Admission medications    Medication Sig Start Date End Date Taking? Authorizing Provider   biotin 1 mg capsule Take 1 capsule (1 mg) by mouth once daily.    Historical Provider, MD   chlorhexidine (Peridex) 0.12 % solution 15 ml swish and spit for 30 seconds night prior to surgery and morning of surgery 5/9/25   Karli Chandra PA-C   HYDROcodone-acetaminophen (Norco) 5-325 mg tablet Take 1 tablet by mouth every 6 hours if needed for severe pain (7 - 10) for up to 3 days.  Patient not taking: Reported on 5/9/2025 5/3/25 5/6/25  Humza Uribe PA-C   multivitamin tablet Take 1 tablet by mouth once daily.    Historical Provider, MD   oxyCODONE-acetaminophen (Percocet) 5-325 mg tablet Take 1 tablet by mouth every 4 hours if needed for severe pain (7 - 10) for up to 7 days. 5/7/25 5/14/25  Emanuel Arthur MD     No medication comments found.   Visit Vitals  OB Status Postmenopausal   Smoking Status Never     Lab Results   Component Value Date    WBC 5.3 05/09/2025    HGB 14.3 05/09/2025    HCT 39.5 05/09/2025     05/09/2025     Lab Results   Component Value Date    TSH 0.73 03/06/2023    GLUCOSE 116 (H) 05/09/2025     (L) 05/09/2025    K 3.4 (L) 05/09/2025    CL 83 (L) 05/09/2025    CREATININE 0.84 05/09/2025    BUN 15 05/09/2025    EGFR 86 05/09/2025    CO2 39 (H) 05/09/2025    AST 39 03/01/2020    ALT 21 03/01/2020     Lab Results   Component Value Date    STAPHMRSASCR (A) 05/09/2025     Isolated: Methicillin Susceptible Staphylococcus aureus (MSSA)     No results found for this or any previous visit (from the past 4464  "hours).  No results found for this or any previous visit from the past 68747 days.    No results found for this or any previous visit from the past 1095 days.     No results found for: \"EF\"  No results found for: \"PREGTESTUR\", \"PREGSERUM\", \"HCG\", \"HCGQUANT\"                         Patient: Karen Gay    Procedure Information       Date/Time: 05/13/25 1035    Procedure: Left Wrist Fracture Open Reduction Internal Fixation (Left: Wrist)    Location: [facility] OR 07 / Virtual [facility] OR    Surgeons: Emanuel Arthur MD            Relevant Problems   /Renal   (+) Hyponatremia       Clinical information reviewed:                   NPO Detail:  No data recorded     Physical Exam    Airway  Mallampati: II     Cardiovascular   Rhythm: regular  Rate: normal     Dental    Pulmonary    Abdominal            Anesthesia Plan    History of general anesthesia?: yes  History of complications of general anesthesia?: no    ASA 2     general and regional     intravenous induction   Anesthetic plan and risks discussed with patient.    Plan discussed with CRNA and CAA.           [1]   Past Medical History:  Diagnosis Date    H/O hypokalemia    [2]   Past Surgical History:  Procedure Laterality Date    COLPOSCOPY     [3]   Social History  Tobacco Use    Smoking status: Never    Smokeless tobacco: Never   Vaping Use    Vaping status: Never Used   Substance Use Topics    Alcohol use: Yes     Alcohol/week: 8.0 standard drinks of alcohol     Types: 8 Standard drinks or equivalent per week    Drug use: Never   [4]   Allergies  Allergen Reactions    Pseudoephedrine Hcl Other     Causes anxiety   [5] No current facility-administered medications for this encounter.    Current Outpatient Medications:     biotin 1 mg capsule, Take 1 capsule (1 mg) by mouth once daily., Disp: , Rfl:     chlorhexidine (Peridex) 0.12 % solution, 15 ml swish and spit for 30 seconds night prior to surgery and morning of surgery, Disp: 473 mL, Rfl: 0    multivitamin " tablet, Take 1 tablet by mouth once daily., Disp: , Rfl:     oxyCODONE-acetaminophen (Percocet) 5-325 mg tablet, Take 1 tablet by mouth every 4 hours if needed for severe pain (7 - 10) for up to 7 days., Disp: 28 tablet, Rfl: 0

## 2025-05-13 ENCOUNTER — APPOINTMENT (OUTPATIENT)
Dept: RADIOLOGY | Facility: HOSPITAL | Age: 50
End: 2025-05-13
Payer: COMMERCIAL

## 2025-05-13 ENCOUNTER — ANESTHESIA (OUTPATIENT)
Dept: OPERATING ROOM | Facility: HOSPITAL | Age: 50
End: 2025-05-13
Payer: COMMERCIAL

## 2025-05-13 ENCOUNTER — HOSPITAL ENCOUNTER (OUTPATIENT)
Facility: HOSPITAL | Age: 50
Setting detail: OUTPATIENT SURGERY
Discharge: HOME | End: 2025-05-13
Attending: ORTHOPAEDIC SURGERY | Admitting: ORTHOPAEDIC SURGERY
Payer: COMMERCIAL

## 2025-05-13 ENCOUNTER — PHARMACY VISIT (OUTPATIENT)
Dept: PHARMACY | Facility: CLINIC | Age: 50
End: 2025-05-13
Payer: COMMERCIAL

## 2025-05-13 VITALS
SYSTOLIC BLOOD PRESSURE: 104 MMHG | HEART RATE: 68 BPM | RESPIRATION RATE: 16 BRPM | WEIGHT: 114.42 LBS | OXYGEN SATURATION: 98 % | DIASTOLIC BLOOD PRESSURE: 69 MMHG | BODY MASS INDEX: 19.53 KG/M2 | TEMPERATURE: 97.5 F | HEIGHT: 64 IN

## 2025-05-13 DIAGNOSIS — S52.602A CLOSED FRACTURE OF LEFT DISTAL RADIUS AND ULNA: Primary | ICD-10-CM

## 2025-05-13 DIAGNOSIS — S52.502A CLOSED FRACTURE OF LEFT DISTAL RADIUS AND ULNA: Primary | ICD-10-CM

## 2025-05-13 LAB — PREGNANCY TEST URINE, POC: NEGATIVE

## 2025-05-13 PROCEDURE — 7100000001 HC RECOVERY ROOM TIME - INITIAL BASE CHARGE: Performed by: ORTHOPAEDIC SURGERY

## 2025-05-13 PROCEDURE — 2500000004 HC RX 250 GENERAL PHARMACY W/ HCPCS (ALT 636 FOR OP/ED): Performed by: ANESTHESIOLOGIST ASSISTANT

## 2025-05-13 PROCEDURE — C1769 GUIDE WIRE: HCPCS | Performed by: ORTHOPAEDIC SURGERY

## 2025-05-13 PROCEDURE — 2500000005 HC RX 250 GENERAL PHARMACY W/O HCPCS: Performed by: ANESTHESIOLOGIST ASSISTANT

## 2025-05-13 PROCEDURE — 3700000002 HC GENERAL ANESTHESIA TIME - EACH INCREMENTAL 1 MINUTE: Performed by: ORTHOPAEDIC SURGERY

## 2025-05-13 PROCEDURE — 2500000005 HC RX 250 GENERAL PHARMACY W/O HCPCS: Performed by: ANESTHESIOLOGY

## 2025-05-13 PROCEDURE — 76000 FLUOROSCOPY <1 HR PHYS/QHP: CPT | Mod: LT

## 2025-05-13 PROCEDURE — 3700000001 HC GENERAL ANESTHESIA TIME - INITIAL BASE CHARGE: Performed by: ORTHOPAEDIC SURGERY

## 2025-05-13 PROCEDURE — A25609 PR OPEN RX DISTAL RADIUS FX, INTRA-ARTICULAR, 3+ FRAG: Performed by: STUDENT IN AN ORGANIZED HEALTH CARE EDUCATION/TRAINING PROGRAM

## 2025-05-13 PROCEDURE — 2500000005 HC RX 250 GENERAL PHARMACY W/O HCPCS: Mod: JZ | Performed by: ORTHOPAEDIC SURGERY

## 2025-05-13 PROCEDURE — 7100000009 HC PHASE TWO TIME - INITIAL BASE CHARGE: Performed by: ORTHOPAEDIC SURGERY

## 2025-05-13 PROCEDURE — 3600000008 HC OR TIME - EACH INCREMENTAL 1 MINUTE - PROCEDURE LEVEL THREE: Performed by: ORTHOPAEDIC SURGERY

## 2025-05-13 PROCEDURE — 7100000002 HC RECOVERY ROOM TIME - EACH INCREMENTAL 1 MINUTE: Performed by: ORTHOPAEDIC SURGERY

## 2025-05-13 PROCEDURE — 2780000003 HC OR 278 NO HCPCS: Performed by: ORTHOPAEDIC SURGERY

## 2025-05-13 PROCEDURE — 2720000007 HC OR 272 NO HCPCS: Performed by: ORTHOPAEDIC SURGERY

## 2025-05-13 PROCEDURE — 7100000010 HC PHASE TWO TIME - EACH INCREMENTAL 1 MINUTE: Performed by: ORTHOPAEDIC SURGERY

## 2025-05-13 PROCEDURE — 2500000004 HC RX 250 GENERAL PHARMACY W/ HCPCS (ALT 636 FOR OP/ED): Performed by: ORTHOPAEDIC SURGERY

## 2025-05-13 PROCEDURE — 25609 OPTX DST RD XART FX/EP SEP3+: CPT | Performed by: ORTHOPAEDIC SURGERY

## 2025-05-13 PROCEDURE — A25609 PR OPEN RX DISTAL RADIUS FX, INTRA-ARTICULAR, 3+ FRAG: Performed by: ANESTHESIOLOGIST ASSISTANT

## 2025-05-13 PROCEDURE — 3600000003 HC OR TIME - INITIAL BASE CHARGE - PROCEDURE LEVEL THREE: Performed by: ORTHOPAEDIC SURGERY

## 2025-05-13 PROCEDURE — 2500000004 HC RX 250 GENERAL PHARMACY W/ HCPCS (ALT 636 FOR OP/ED): Mod: JZ | Performed by: ANESTHESIOLOGY

## 2025-05-13 PROCEDURE — 64415 NJX AA&/STRD BRCH PLXS IMG: CPT | Performed by: ANESTHESIOLOGY

## 2025-05-13 PROCEDURE — RXMED WILLOW AMBULATORY MEDICATION CHARGE

## 2025-05-13 PROCEDURE — C1713 ANCHOR/SCREW BN/BN,TIS/BN: HCPCS | Performed by: ORTHOPAEDIC SURGERY

## 2025-05-13 DEVICE — SCREW, 3.5 X 14 LP TI: Type: IMPLANTABLE DEVICE | Site: WRIST | Status: FUNCTIONAL

## 2025-05-13 DEVICE — IMPLANTABLE DEVICE: Type: IMPLANTABLE DEVICE | Site: WRIST | Status: FUNCTIONAL

## 2025-05-13 DEVICE — IMPLANTABLE DEVICE: Type: IMPLANTABLE DEVICE | Site: WRIST | Status: NON-FUNCTIONAL

## 2025-05-13 RX ORDER — FENTANYL CITRATE 50 UG/ML
INJECTION, SOLUTION INTRAMUSCULAR; INTRAVENOUS AS NEEDED
Status: DISCONTINUED | OUTPATIENT
Start: 2025-05-13 | End: 2025-05-13

## 2025-05-13 RX ORDER — LIDOCAINE HYDROCHLORIDE 10 MG/ML
0.1 INJECTION, SOLUTION EPIDURAL; INFILTRATION; INTRACAUDAL; PERINEURAL ONCE
Status: DISCONTINUED | OUTPATIENT
Start: 2025-05-13 | End: 2025-05-13 | Stop reason: HOSPADM

## 2025-05-13 RX ORDER — LIDOCAINE HYDROCHLORIDE 20 MG/ML
INJECTION, SOLUTION INFILTRATION; PERINEURAL AS NEEDED
Status: DISCONTINUED | OUTPATIENT
Start: 2025-05-13 | End: 2025-05-13

## 2025-05-13 RX ORDER — DOCUSATE SODIUM 100 MG/1
100 CAPSULE, LIQUID FILLED ORAL 2 TIMES DAILY
Qty: 20 CAPSULE | Refills: 0 | Status: SHIPPED | OUTPATIENT
Start: 2025-05-13 | End: 2025-05-23

## 2025-05-13 RX ORDER — ACETAMINOPHEN 325 MG/1
650 TABLET ORAL EVERY 4 HOURS PRN
Status: DISCONTINUED | OUTPATIENT
Start: 2025-05-13 | End: 2025-05-13 | Stop reason: HOSPADM

## 2025-05-13 RX ORDER — BUPIVACAINE HCL/EPINEPHRINE 0.5-1:200K
VIAL (ML) INJECTION
Status: COMPLETED | OUTPATIENT
Start: 2025-05-13 | End: 2025-05-13

## 2025-05-13 RX ORDER — DROPERIDOL 2.5 MG/ML
0.62 INJECTION, SOLUTION INTRAMUSCULAR; INTRAVENOUS ONCE AS NEEDED
Status: DISCONTINUED | OUTPATIENT
Start: 2025-05-13 | End: 2025-05-13 | Stop reason: HOSPADM

## 2025-05-13 RX ORDER — IPRATROPIUM BROMIDE 0.5 MG/2.5ML
500 SOLUTION RESPIRATORY (INHALATION) ONCE
Status: DISCONTINUED | OUTPATIENT
Start: 2025-05-13 | End: 2025-05-13 | Stop reason: HOSPADM

## 2025-05-13 RX ORDER — ONDANSETRON 4 MG/1
4 TABLET, FILM COATED ORAL EVERY 8 HOURS PRN
Qty: 10 TABLET | Refills: 0 | Status: SHIPPED | OUTPATIENT
Start: 2025-05-13

## 2025-05-13 RX ORDER — SODIUM CHLORIDE 0.9 G/100ML
INJECTION, SOLUTION IRRIGATION AS NEEDED
Status: DISCONTINUED | OUTPATIENT
Start: 2025-05-13 | End: 2025-05-13 | Stop reason: HOSPADM

## 2025-05-13 RX ORDER — ASPIRIN 81 MG/1
81 TABLET ORAL EVERY 12 HOURS
Qty: 60 TABLET | Refills: 0 | Status: SHIPPED | OUTPATIENT
Start: 2025-05-13 | End: 2025-06-12

## 2025-05-13 RX ORDER — SODIUM CHLORIDE, SODIUM LACTATE, POTASSIUM CHLORIDE, CALCIUM CHLORIDE 600; 310; 30; 20 MG/100ML; MG/100ML; MG/100ML; MG/100ML
100 INJECTION, SOLUTION INTRAVENOUS CONTINUOUS
Status: DISCONTINUED | OUTPATIENT
Start: 2025-05-13 | End: 2025-05-13 | Stop reason: HOSPADM

## 2025-05-13 RX ORDER — ONDANSETRON HYDROCHLORIDE 2 MG/ML
4 INJECTION, SOLUTION INTRAVENOUS ONCE AS NEEDED
Status: DISCONTINUED | OUTPATIENT
Start: 2025-05-13 | End: 2025-05-13 | Stop reason: HOSPADM

## 2025-05-13 RX ORDER — ONDANSETRON HYDROCHLORIDE 2 MG/ML
INJECTION, SOLUTION INTRAVENOUS AS NEEDED
Status: DISCONTINUED | OUTPATIENT
Start: 2025-05-13 | End: 2025-05-13

## 2025-05-13 RX ORDER — PROPOFOL 10 MG/ML
INJECTION, EMULSION INTRAVENOUS AS NEEDED
Status: DISCONTINUED | OUTPATIENT
Start: 2025-05-13 | End: 2025-05-13

## 2025-05-13 RX ORDER — TRANEXAMIC ACID 100 MG/ML
INJECTION, SOLUTION INTRAVENOUS AS NEEDED
Status: DISCONTINUED | OUTPATIENT
Start: 2025-05-13 | End: 2025-05-13

## 2025-05-13 RX ORDER — ALBUTEROL SULFATE 0.83 MG/ML
2.5 SOLUTION RESPIRATORY (INHALATION) ONCE AS NEEDED
Status: DISCONTINUED | OUTPATIENT
Start: 2025-05-13 | End: 2025-05-13 | Stop reason: HOSPADM

## 2025-05-13 RX ORDER — PHENYLEPHRINE HCL IN 0.9% NACL 1 MG/10 ML
SYRINGE (ML) INTRAVENOUS AS NEEDED
Status: DISCONTINUED | OUTPATIENT
Start: 2025-05-13 | End: 2025-05-13

## 2025-05-13 RX ORDER — OXYCODONE HYDROCHLORIDE 5 MG/1
5 TABLET ORAL EVERY 6 HOURS PRN
Qty: 28 TABLET | Refills: 0 | Status: SHIPPED | OUTPATIENT
Start: 2025-05-13 | End: 2025-05-20

## 2025-05-13 RX ORDER — MIDAZOLAM HYDROCHLORIDE 1 MG/ML
INJECTION, SOLUTION INTRAMUSCULAR; INTRAVENOUS
Status: COMPLETED | OUTPATIENT
Start: 2025-05-13 | End: 2025-05-13

## 2025-05-13 RX ORDER — BUPIVACAINE HYDROCHLORIDE 5 MG/ML
INJECTION, SOLUTION PERINEURAL AS NEEDED
Status: DISCONTINUED | OUTPATIENT
Start: 2025-05-13 | End: 2025-05-13 | Stop reason: HOSPADM

## 2025-05-13 RX ORDER — OXYCODONE HYDROCHLORIDE 5 MG/1
5 TABLET ORAL EVERY 4 HOURS PRN
Status: DISCONTINUED | OUTPATIENT
Start: 2025-05-13 | End: 2025-05-13 | Stop reason: HOSPADM

## 2025-05-13 RX ORDER — CEFAZOLIN 1 G/1
INJECTION, POWDER, FOR SOLUTION INTRAVENOUS AS NEEDED
Status: DISCONTINUED | OUTPATIENT
Start: 2025-05-13 | End: 2025-05-13

## 2025-05-13 RX ADMIN — Medication 100 MCG: at 12:18

## 2025-05-13 RX ADMIN — Medication 30 ML: at 10:18

## 2025-05-13 RX ADMIN — LIDOCAINE HYDROCHLORIDE 100 MG: 20 INJECTION, SOLUTION INFILTRATION; PERINEURAL at 11:35

## 2025-05-13 RX ADMIN — DEXAMETHASONE SODIUM PHOSPHATE 4 MG: 4 INJECTION, SOLUTION INTRAMUSCULAR; INTRAVENOUS at 10:18

## 2025-05-13 RX ADMIN — PROPOFOL 150 MG: 10 INJECTION, EMULSION INTRAVENOUS at 11:35

## 2025-05-13 RX ADMIN — ONDANSETRON 4 MG: 2 INJECTION, SOLUTION INTRAMUSCULAR; INTRAVENOUS at 11:45

## 2025-05-13 RX ADMIN — GLYCOPYRROLATE 0.2 MG: 0.2 INJECTION, SOLUTION INTRAMUSCULAR; INTRAVENOUS at 12:20

## 2025-05-13 RX ADMIN — SODIUM CHLORIDE, POTASSIUM CHLORIDE, SODIUM LACTATE AND CALCIUM CHLORIDE: 600; 310; 30; 20 INJECTION, SOLUTION INTRAVENOUS at 11:27

## 2025-05-13 RX ADMIN — MIDAZOLAM HYDROCHLORIDE 2 MG: 1 INJECTION, SOLUTION INTRAMUSCULAR; INTRAVENOUS at 10:18

## 2025-05-13 RX ADMIN — FENTANYL CITRATE 25 MCG: 50 INJECTION, SOLUTION INTRAMUSCULAR; INTRAVENOUS at 11:35

## 2025-05-13 RX ADMIN — TRANEXAMIC ACID 500 MG: 1 INJECTION, SOLUTION INTRAVENOUS at 11:42

## 2025-05-13 RX ADMIN — CEFAZOLIN 2 G: 1 INJECTION, POWDER, FOR SOLUTION INTRAMUSCULAR; INTRAVENOUS at 11:37

## 2025-05-13 ASSESSMENT — PAIN DESCRIPTION - DESCRIPTORS: DESCRIPTORS: ACHING

## 2025-05-13 ASSESSMENT — PAIN - FUNCTIONAL ASSESSMENT
PAIN_FUNCTIONAL_ASSESSMENT: 0-10
PAIN_FUNCTIONAL_ASSESSMENT: UNABLE TO SELF-REPORT
PAIN_FUNCTIONAL_ASSESSMENT: 0-10
PAIN_FUNCTIONAL_ASSESSMENT: 0-10

## 2025-05-13 ASSESSMENT — PAIN SCALES - GENERAL
PAINLEVEL_OUTOF10: 0 - NO PAIN
PAINLEVEL_OUTOF10: 4
PAINLEVEL_OUTOF10: 0 - NO PAIN

## 2025-05-13 NOTE — ANESTHESIA PROCEDURE NOTES
Peripheral Block    Patient location during procedure: pre-op  Medication administered at: 5/13/2025 10:18 AM  End time: 5/13/2025 10:30 AM  Reason for block: procedure for pain, at surgeon's request and post-op pain management  Staffing  Performed: attending   Authorized by: Demetri Arellano MD    Performed by: Demetri Arellano MD  Preanesthetic Checklist  Completed: patient identified, IV checked, site marked, risks and benefits discussed, surgical consent, monitors and equipment checked, pre-op evaluation and timeout performed   Timeout performed at: 5/13/2025 10:18 AM  Peripheral Block  Patient position: sitting  Prep: alcohol swabs  Patient monitoring: continuous pulse ox  Block type: supraclavicular  Laterality: left  Injection technique: single-shot  Guidance: ultrasound guided  Local infiltration: lidocaine  Needle  Needle type: short-bevel   Needle gauge: 22 G  Needle length: 5 cm  Needle localization: ultrasound guidance     image stored in chart  Test dose: negative  Assessment  Injection assessment: negative aspiration for heme, no paresthesia on injection, incremental injection and local visualized surrounding nerve on ultrasound  Heart rate change: no  Slow fractionated injection: yes  Additional Notes  40 ml 0.375% bupivacaine with 1:200K epi and 4 mg dexamethasone injected  Medications Administered  dexAMETHasone (Decadron) injection - perineural injection   4 mg - 5/13/2025 10:18:00 AM  midazolam (VERSED) IV - intravenous   2 mg - 5/13/2025 10:18:00 AM  bupivacaine-EPINEPHrine (MARCAINE w/EPI) 0.5% -1:[id number] Perineural - perineural injection   30 mL - 5/13/2025 10:18:00 AM

## 2025-05-13 NOTE — BRIEF OP NOTE
Date: 2025  OR Location: [facility] OR    Name: Karen Gay, : 1975, Age: 49 y.o., MRN: [id number], Sex: female    Diagnosis  Pre-op Diagnosis      * Closed fracture of distal ends of left radius and ulna, initial encounter [S52.502A, S52.602A] Post-op Diagnosis     * Closed fracture of distal ends of left radius and ulna, initial encounter [S52.502A, S52.602A]     Procedures  Left Wrist Fracture Open Reduction Internal Fixation  35586 - [place] OPTX DSTL RADL I-ARTIC FX/EPIPHYSL SEP 3+ FRAG      Surgeons      * Emanuel Arthur - Primary    Resident/Fellow/Other Assistant:  Surgeons and Role:     * Melany Kaye MD - Resident - Assisting     * Aries Torres MD - Resident - Assisting    Staff:   Circulator: Leonor  Scrub Person: Flex Jovelub Person: Aylin Rowland Circulator: Brian    Anesthesia Staff: Anesthesiologist: Demetri Arellano MD; Sourav Best MD  C-AA: SHEMAR Martínez    Procedure Summary  Anesthesia: Regional, General  ASA: II  Estimated Blood Loss: 5mL  Intra-op Medications:   Administrations occurring from 1035 to 1235 on 25:   Medication Name Total Dose   sodium chloride 0.9 % irrigation solution 1,000 mL   ceFAZolin (Ancef) vial 1 g 2 g   dexAMETHasone (Decadron) 4 mg/mL 8 mg   fentaNYL (Sublimaze) injection 50 mcg/mL 25 mcg   glycopyrrolate PF (Robinul) injection 0.2 mg   LR bolus Cannot be calculated   lidocaine (Xylocaine) injection 2 % 100 mg   ondansetron 2 mg/mL 4 mg   phenylephrine 100 mcg/mL syringe 10 mL (prefilled) 100 mcg   propofol (Diprivan) injection 10 mg/mL 150 mg   tranexamic acid (Cyklokapron) injection 500 mg              Anesthesia Record               Intraprocedure I/O Totals          Intake    LR bolus 1000.00 mL    Tranexamic Acid 0.00 mL    The total shown is the total volume documented since Anesthesia Start was filed.    Total Intake 1000 mL       Output    Est. Blood Loss 5 mL    Total Output 5 mL       Net    Net Volume 995 mL          Specimen:  No specimens collected               Findings: Intra articular left distal radius fracture and left ulnar styloid fracture    Complications:  None; patient tolerated the procedure well.     Disposition: PACU - hemodynamically stable.  Condition: stable  Specimens Collected: No specimens collected  Attending Attestation: I was present and scrubbed for the entire procedure.    Emanuel Arthur  Phone Number: [phone]

## 2025-05-13 NOTE — OP NOTE
Left Wrist Fracture Open Reduction Internal Fixation (L) Operative Note     Date: 2025  OR Location: [facility] A OR    Name: Karen Gay, : 1975, Age: 49 y.o., MRN: [id number], Sex: female    Diagnosis  Pre-op Diagnosis      * Closed fracture of distal ends of left radius and ulna, initial encounter [S52.502A, S52.602A] Post-op Diagnosis     * Closed fracture of distal ends of left radius and ulna, initial encounter [S52.502A, S52.602A]     Procedures  Left Wrist Fracture Open Reduction Internal Fixation  67732 - [place] OPTX DSTL RADL I-ARTIC FX/EPIPHYSL SEP 3+ FRAG      Surgeons      * Emanuel Arthur - Primary    Resident/Fellow/Other Assistant:  Surgeons and Role:     * Melany Kaye MD - Resident - Assisting     * Aries Torres MD - Resident - Assisting    Staff:   Circulator: Leonor  Scrub Person: Flex  Scrub Person: Aylin Rowland Circulator: Brian    Anesthesia Staff: Anesthesiologist: Demetri Arellano MD; Sourav Best MD  C-AA: SHEMAR Martínez    Procedure Summary  Anesthesia: Regional, General  ASA: II  Estimated Blood Loss: 5mL  Intra-op Medications:   Administrations occurring from 1035 to 1235 on 25:   Medication Name Total Dose   sodium chloride 0.9 % irrigation solution 1,000 mL   ceFAZolin (Ancef) vial 1 g 2 g   dexAMETHasone (Decadron) 4 mg/mL 8 mg   fentaNYL (Sublimaze) injection 50 mcg/mL 25 mcg   glycopyrrolate PF (Robinul) injection 0.2 mg   LR bolus Cannot be calculated   lidocaine (Xylocaine) injection 2 % 100 mg   ondansetron 2 mg/mL 4 mg   phenylephrine 100 mcg/mL syringe 10 mL (prefilled) 100 mcg   propofol (Diprivan) injection 10 mg/mL 150 mg   tranexamic acid (Cyklokapron) injection 500 mg              Anesthesia Record               Intraprocedure I/O Totals          Intake    LR bolus 1000.00 mL    Tranexamic Acid 0.00 mL    The total shown is the total volume documented since Anesthesia Start was filed.    Total Intake 1000 mL       Output    Est. Blood Loss 5  mL    Total Output 5 mL       Net    Net Volume 995 mL          Specimen: No specimens collected              Drains and/or Catheters: * None in log *    Tourniquet Times:     Total Tourniquet Time Documented:  Arm - Upper (Left) - 61 minutes  Total: Arm - Upper (Left) - 61 minutes      Implants:  Implants       Type Name Action Serial No.      Screw PLATE, VOLAR DISTAL RADIUS, 3H, NARROW, LT - SN/A - [id number] Implanted N/A     Screw SCREW, 3.5 X 14 LP TI - SN/A - [id number] Implanted N/A     Screw SCREW, LOW PROFILE, CORTEX, 2.4 X 24MM - SN/A - [id number] Used, Not Implanted N/A     Screw arthrex screw Implanted N/A     Screw arthrex screw Implanted N/A     Screw arthrex screw Implanted N/A              Findings: L distal radius fracture with intra-articular extension and dorsal angulation    Indications: Karen Gay is an 49 y.o. female who is having surgery for Closed fracture of distal ends of left radius and ulna, initial encounter [S52.681A, S52.402A].     The patient was seen in the preoperative area. The risks, benefits, complications, treatment options, non-operative alternatives, expected recovery and outcomes were discussed with the patient. The possibilities of reaction to medication, pulmonary aspiration, injury to surrounding structures, bleeding, recurrent infection, the need for additional procedures, failure to diagnose a condition, and creating a complication requiring transfusion or operation were discussed with the patient. The patient concurred with the proposed plan, giving informed consent.  The site of surgery was properly noted/marked if necessary per policy. The patient has been actively warmed in preoperative area. Preoperative antibiotics have been ordered and given within 1 hours of incision. Venous thrombosis prophylaxis have been ordered including bilateral sequential compression devices    Procedure Details:   The patient was then transferred to the operating room suite and  mobilized from the [facility] to the operating room table. A time-out was performed to confirm the patient's identity and the correct laterality of surgery. Anesthesia was administered without complication.    The patient was then prepped and draped in the usual sterile fashion. Appropriate pre-operative antibiotics were administered. TXA was administered. Non-sterile tourniquet was applied.     Incision was carried out using 15 blade overlying the FCR tendon from the wrist crease proximally approximately 4 to 5 cm in length.  Tenotomy's were then used for free of the soft tissue and incised tendon sheath to FCR tendon.  The tendon sheath was then released using tenotomy's.  FCR tendon was then retracted ulnarly.  15 blade was then used to carefully dissect fascia at the FPL.  FPL was then swept ulnarly also and PQ was released from the bone.  Retractors were then carefully placed to protect neurovascular bundle.  The distal radius fracture was then identified.  Fracture edges were then sharply debrided.  At this point in time close reduction was then performed.  Fluoroscopy was brought in which demonstrated restoration of length alignment rotation of the fracture.  Narrow Arthrex plate was then selected and placed over the bone.  Cortical screw was then placed in the oblong hole at the appropriate position confirmed on fluoroscopy.  Cortical screw was then also placed on the distal row to help reestablish volar tilt.  Locking screws were then placed in the distal row along with the radial styloid screw.  The cortical screw this was then exchanged for another locking screw.  Another cortical screw was then placed in the most proximal hole.  Fluoroscopy was then brought in and final x-rays were then obtained which demonstrated restoration of length alignment and rotation.  The wounds were then copiously irrigated.  Skin was then closed with 2-0 nylon and 3-0 Monocryl in a running fashion.  Incision was then  dressed with Xeroform fluffs web roll and a volar resting splint.  Tourniquet was then deflated. patient was then awoken from anesthesia and transferred recovery without any immediate complications.    Evidence of Infection: No   Complications:  None; patient tolerated the procedure well.    Disposition: PACU - hemodynamically stable.  Condition: stable     Additional Details: Patient will be nonweightbearing to left upper extremity.  They are okay to begin range of motion of fingers.  They will follow-up in clinic in 7 to 10 days for splint removal and wound check.  They will be discharged home from recovery with appropriate pain medications.    Attending Attestation: I was present for the entire procedure.    Emanuel Arthur  Phone Number: [phone]

## 2025-05-13 NOTE — ANESTHESIA PROCEDURE NOTES
Airway  Date/Time: 5/13/2025 11:36 AM  Reason: elective    Airway not difficult    Staffing  Performed: CAA   Authorized by: Demetri Arellano MD    Performed by: SHEMAR Martínez  Patient location during procedure: OR    Patient Condition  Indications for airway management: anesthesia  Patient position: sniffing  MILS not maintained throughout  Planned trial extubation  Sedation level: deep     Final Airway Details   Preoxygenated: yes  Final airway type: supraglottic airway  Successful airway: Supraglottic airway: IGel.  Size: 3  Number of attempts at approach: 1

## 2025-05-13 NOTE — DISCHARGE INSTRUCTIONS
Orthopaedic Surgery Discharge Instructions:  Follow-Up Instructions  You will need to be seen in clinic by Dr. Arthur in 2 weeks for a post-operative evaluation.    You will need to call and schedule an appointment, unless there is a previous appointment that appears on your discharge instructions.  The direct orthopaedic clinic appointment line phone number is [phone].  Please do not delay in calling to make this appointment.    You should also follow up with your primary care provider in 1-2 weeks.    Activity Restrictions  1) No driving until further instructed by your orthopaedic physician, which will be addressed at your outpatient appointments.    2) No driving or operating heavy machinery while taking narcotic pain medication.    3) Weight bearing status --> non weight bearing left arm.     Discharge Medications  You have been sent home with the following home medications: Oxycodone, Colace, and Aspirin.  Please wean yourself off the oxycodone, as tolerated. A good time to take the medication is before physical therapy sessions and bedtime. Colace is a stool softener to reduce the narcotic pain medications cause. Take it twice a day while taking narcotic pain medication to ensure you maintain your regular bowel movement frequency. Baby aspirin is taken twice daily to help reduce your risk of blood clots.    You should also take tylenol 650mg every 6 hours as needed to reduce the amount of oxycodone you need for pain.    MEDICATION SIDE EFFECTS.  OXYCODONE: constipation, nausea, vomiting, upset stomach, (sleepiness), dizziness, lightheadedness, itching, headache, blurred vision, dry mouth, sweating  TRAMADOL: headache, dizziness, drowsiness, tired feeling; constipation, diarrhea, nausea, vomiting, stomach pain; feeling nervous or anxious, itching, sweating, flushing.  ASPIRIN:  upset stomach, heartburn; drowsiness; or headache    Splint/Cast care instructions:   1) Keep your splint on until your follow  up visit with your surgeon.    2) Do not get your splint/cast wet for any reason. This includes protecting it from shower water, bath water, and the rain. If the cast/splint becomes wet for any reason, you need to be seen immediately, either in the emergency department or in the first available clinic appointment, in order to have the splint/cast changed. Allowing a wet splint/cast to sit on your skin may cause skin breakdown and infection.    3) Do not stick any sharp objects (knives, forks, clothes hangers, etc) inside your splint/cast to itch. These objects scratch the skin, which may become infected. Alternatively, you may blow a hair dryer, on the cool air setting, in order to provide some relief.    4) You should keep your operative or injured extremity elevated at or above the level of your heart for the first 48-72 hours. This will help minimize the swelling in the immediate aftermath from surgery or from an acute fracture/injury.    5) You may ice your injured/operative extremity, which is especially useful to minimize swelling, in the first 48-72 hours. Make sure that the ice is not in direct contact with your skin, and that the ice does not leak out of it's bag. It will take ~30 minutes for the ice/cooling to move through your splint/cast material, but it will do so. Double-bagging ice is an effective technique.    6) If you begin to experience progressive and rapidly increasing pain that seems out of proportion to what you normally have been experiencing from your baseline pain after surgery/injury, or if your hand or foot become numb or turn blue and cold - you NEED TO CALL US IMMEDIATELY. Alternatively, you may come into the emergency department IMMEDIATELY for an emergent evaluation.

## 2025-05-13 NOTE — NURSING NOTE
1330- assumed care of patient from [place] in phase 2. Medications with patient already has medications at bedside.   1345- patient discharge teaching completed with Jamee, patient educated on effects of general anesthesia and block. Patient given contact information if numbness continues more then 72 hours. And signs and symptoms of infection. Patient has no questions at this time   1352- patient getting dressed with mom Kely's help   1401- IV removed catheter intake

## 2025-05-13 NOTE — ANESTHESIA POSTPROCEDURE EVALUATION
Patient: Karen Gay    Procedure Summary       Date: 05/13/25 Room / Location: [facility] OR 07 / [facility] OR    Anesthesia Start: 1127 Anesthesia Stop: 1255    Procedure: Left Wrist Fracture Open Reduction Internal Fixation (Left: Wrist) Diagnosis:       Closed fracture of distal ends of left radius and ulna, initial encounter      (Closed fracture of distal ends of left radius and ulna, initial encounter [S52.502A, S52.602A])    Surgeons: Emanuel Arthur MD Responsible Provider: Sourav Best MD    Anesthesia Type: general, regional ASA Status: 2            Anesthesia Type: general, regional    Vitals Value Taken Time   /80 05/13/25 13:22   Temp 36 °C (96.8 °F) 05/13/25 12:52   Pulse 72 05/13/25 13:22   Resp 15 05/13/25 13:22   SpO2 98 % 05/13/25 13:22       Anesthesia Post Evaluation    Patient location during evaluation: bedside  Patient participation: complete - patient participated  Level of consciousness: awake  Pain management: adequate  Multimodal analgesia pain management approach  Airway patency: patent  Cardiovascular status: stable  Respiratory status: spontaneous ventilation and unassisted  Hydration status: acceptable  Postoperative Nausea and Vomiting: none  Comments: No significant PONV.        No notable events documented.

## 2025-05-29 ENCOUNTER — OFFICE VISIT (OUTPATIENT)
Dept: ORTHOPEDIC SURGERY | Facility: CLINIC | Age: 50
End: 2025-05-29
Payer: COMMERCIAL

## 2025-05-29 ENCOUNTER — HOSPITAL ENCOUNTER (OUTPATIENT)
Dept: RADIOLOGY | Facility: CLINIC | Age: 50
Discharge: HOME | End: 2025-05-29
Payer: COMMERCIAL

## 2025-05-29 DIAGNOSIS — S52.602A CLOSED FRACTURE OF DISTAL ENDS OF LEFT RADIUS AND ULNA, INITIAL ENCOUNTER: ICD-10-CM

## 2025-05-29 DIAGNOSIS — S52.502A CLOSED FRACTURE OF DISTAL ENDS OF LEFT RADIUS AND ULNA, INITIAL ENCOUNTER: ICD-10-CM

## 2025-05-29 PROCEDURE — 73110 X-RAY EXAM OF WRIST: CPT | Mod: LEFT SIDE | Performed by: STUDENT IN AN ORGANIZED HEALTH CARE EDUCATION/TRAINING PROGRAM

## 2025-05-29 PROCEDURE — 73110 X-RAY EXAM OF WRIST: CPT | Mod: LT

## 2025-05-29 PROCEDURE — L3908 WHO COCK-UP NONMOLDE PRE OTS: HCPCS | Performed by: ORTHOPAEDIC SURGERY

## 2025-05-29 PROCEDURE — 99211 OFF/OP EST MAY X REQ PHY/QHP: CPT | Performed by: ORTHOPAEDIC SURGERY

## 2025-05-29 PROCEDURE — 1036F TOBACCO NON-USER: CPT | Performed by: ORTHOPAEDIC SURGERY

## 2025-05-29 RX ORDER — OXYCODONE HYDROCHLORIDE 5 MG/1
5 TABLET ORAL EVERY 6 HOURS PRN
Qty: 28 TABLET | Refills: 0 | Status: SHIPPED | OUTPATIENT
Start: 2025-05-29 | End: 2025-06-05

## 2025-05-29 NOTE — PROGRESS NOTES
Postop above fixation wrist fracture incision looks fine digits are mobile x-rays show hardware in good position plan splint early range of motion exercises limit lifting or pushing follow-up 2 weeks for x-rays will probably initiate therapy then  Prescription for postop pain meds

## 2025-06-11 ENCOUNTER — TELEPHONE (OUTPATIENT)
Dept: ORTHOPEDIC SURGERY | Facility: CLINIC | Age: 50
End: 2025-06-11
Payer: COMMERCIAL

## 2025-06-19 ENCOUNTER — APPOINTMENT (OUTPATIENT)
Dept: ORTHOPEDIC SURGERY | Facility: CLINIC | Age: 50
End: 2025-06-19
Payer: COMMERCIAL

## 2025-06-23 ENCOUNTER — OFFICE VISIT (OUTPATIENT)
Dept: ORTHOPEDIC SURGERY | Facility: CLINIC | Age: 50
End: 2025-06-23
Payer: COMMERCIAL

## 2025-06-23 ENCOUNTER — HOSPITAL ENCOUNTER (OUTPATIENT)
Dept: RADIOLOGY | Facility: CLINIC | Age: 50
Discharge: HOME | End: 2025-06-23
Payer: COMMERCIAL

## 2025-06-23 DIAGNOSIS — S52.502A CLOSED FRACTURE OF DISTAL ENDS OF LEFT RADIUS AND ULNA, INITIAL ENCOUNTER: ICD-10-CM

## 2025-06-23 DIAGNOSIS — S52.502A CLOSED FRACTURE OF DISTAL ENDS OF LEFT RADIUS AND ULNA, INITIAL ENCOUNTER: Primary | ICD-10-CM

## 2025-06-23 DIAGNOSIS — S52.602A CLOSED FRACTURE OF DISTAL ENDS OF LEFT RADIUS AND ULNA, INITIAL ENCOUNTER: ICD-10-CM

## 2025-06-23 DIAGNOSIS — S52.602A CLOSED FRACTURE OF DISTAL ENDS OF LEFT RADIUS AND ULNA, INITIAL ENCOUNTER: Primary | ICD-10-CM

## 2025-06-23 PROCEDURE — 99024 POSTOP FOLLOW-UP VISIT: CPT | Performed by: ORTHOPAEDIC SURGERY

## 2025-06-23 PROCEDURE — 73110 X-RAY EXAM OF WRIST: CPT | Mod: LEFT SIDE | Performed by: RADIOLOGY

## 2025-06-23 PROCEDURE — 1036F TOBACCO NON-USER: CPT | Performed by: ORTHOPAEDIC SURGERY

## 2025-06-23 PROCEDURE — 73110 X-RAY EXAM OF WRIST: CPT | Mod: LT

## 2025-06-23 ASSESSMENT — PAIN DESCRIPTION - DESCRIPTORS: DESCRIPTORS: SORE

## 2025-06-23 ASSESSMENT — PAIN SCALES - GENERAL: PAINLEVEL_OUTOF10: 3

## 2025-06-23 ASSESSMENT — PAIN - FUNCTIONAL ASSESSMENT: PAIN_FUNCTIONAL_ASSESSMENT: 0-10

## 2025-06-23 NOTE — PROGRESS NOTES
6 weeks status post fixation right distal radius fracture stiff and sore  On exam incisions healing nicely she has limited mobility of the wrist in all planes tendon function nerve function intact  X-rays show interval healing of fracture in good position assessment healing fracture plan occupational therapy discontinue splint follow-up 4 to 6 weeks for final x-ray and exam

## 2025-06-30 ENCOUNTER — EVALUATION (OUTPATIENT)
Dept: OCCUPATIONAL THERAPY | Facility: HOSPITAL | Age: 50
End: 2025-06-30
Payer: COMMERCIAL

## 2025-06-30 DIAGNOSIS — S52.502A CLOSED FRACTURE OF DISTAL ENDS OF LEFT RADIUS AND ULNA, INITIAL ENCOUNTER: ICD-10-CM

## 2025-06-30 DIAGNOSIS — S52.532D CLOSED COLLES' FRACTURE OF LEFT RADIUS WITH ROUTINE HEALING, SUBSEQUENT ENCOUNTER: Primary | ICD-10-CM

## 2025-06-30 DIAGNOSIS — S52.602A CLOSED FRACTURE OF DISTAL ENDS OF LEFT RADIUS AND ULNA, INITIAL ENCOUNTER: ICD-10-CM

## 2025-06-30 PROBLEM — S52.502D CLOSED FRACTURE OF LOWER END OF LEFT RADIUS WITH ROUTINE HEALING: Status: ACTIVE | Noted: 2025-05-06

## 2025-06-30 PROCEDURE — 97165 OT EVAL LOW COMPLEX 30 MIN: CPT | Mod: GO | Performed by: OCCUPATIONAL THERAPIST

## 2025-06-30 PROCEDURE — 97110 THERAPEUTIC EXERCISES: CPT | Mod: GO | Performed by: OCCUPATIONAL THERAPIST

## 2025-06-30 ASSESSMENT — PAIN - FUNCTIONAL ASSESSMENT: PAIN_FUNCTIONAL_ASSESSMENT: 0-10

## 2025-06-30 ASSESSMENT — PAIN SCALES - GENERAL: PAINLEVEL_OUTOF10: 3

## 2025-06-30 NOTE — PROGRESS NOTES
"    Occupational Therapy  Occupational Therapy Orthopedic Evaluation    Patient Name: Karen Gay  MRN: [id number]  Today's Date: 6/30/2025  Time Calculation  Start Time: 1245  Stop Time: 1330  Time Calculation (min): 45 min    Time:  Time Calculation  Start Time: 1245  Stop Time: 1330  Time Calculation (min): 45 min  OT Evaluation Time Entry  Evaluation (Low) Time Entry: 20  OT Therapeutic Procedures Time Entry  Therapeutic Exercise Time Entry: 25    Insurance:  Visit number: 1 of 16  Authorization info: no authorization   Insurance Type: Payor: [employer] [facility] / Plan: [employer] / Product Type: *No Product type* /    General:  Reason for visit: L distal radius and ulna FX's  Referred by: Nicholas    Current Problem  1. Closed Colles' fracture of left radius with routine healing, subsequent encounter  Follow Up In Occupational Therapy      2. Closed fracture of distal ends of left radius and ulna, initial encounter  Referral to Occupational Therapy    Follow Up In Occupational Therapy          Precautions: none         Medical History Form: Reviewed (scanned into chart)    Subjective:   Chief Complaint: \"I can't bend my wrist back at all.\"  Onset: Patient reports injuring her L wrist while getting up in the middle of the night and falling resulting in a L distal radius and ulna fracture. Patient underwent an ORIF on 5/13/25.   FERNANDO: Fall   DOI/DOS: DOI=5/3/25, DOS=5/13/25      Hand Dominance: Right    Current Condition since injury:   better      PAIN  Pain Assessment: 0-10  0-10 (Numeric) Pain Score: 3  Pain Type: Acute pain  Pain Location: Wrist  Pain Orientation: Left  Pain Radiating Towards: hand  Aggravating Factors: Gripping/pinching, Finger/Thumb Flexion, and Finger/Thumb extension   Relieving Factors: Rest and Heat     Relevant Information (PMH & Previous Tests/Imaging): No significant PMH is noted  Previous Interventions/Treatments: None     Prior Level of Function " "(PLOF)  Exercise/Physical Activity: Patient reports being independent with all ADL's prior to injury. Patient actively participates in yoga.   Work/School: Patient is employed full-time as a . Patient also works part-time as a (currently not working in this position)  Current ADL/IADL Status: All ADL's involving distal L UE are impaired.      Patients Living Environment: Reviewed and no concern. Patient lives alone and is currently staying with her mother.     Primary Language: English    There are no spiritual/cultural practices/values/needs that are important to know      Pt goals for therapy: \"Normal use\" of L UE with all ADL's    Red Flags: Do you have any of the following? No  Fever/chills, unexplained weight changes, dizziness/fainting, unexplained change in bowel or bladder functions, unexplained malaise or muscle weakness, night pain/sweats, numbness or tingling    Objective:  Evaluation Complexity=Low  Rehab Prognosis=Good  Fall Risk=Low    RIGHT HAND AROM (Degrees)   MCP PIP DIP LAGUNAS DPC   IF      0   MF     0   RF     0   SF     0   Thumb WFL  WFL     Thumb RABD 45       Thumb PABD 65         LEFT HAND AROM (Degrees)   MCP PIP DIP LAGUNAS DPC   IF      -3.5 cm    MF     -4.0 cm    RF     -3.5 cm   SF     -3.0 cm    Thumb IMP  IMP     Thumb RABD 40       Thumb PABD 55         WRIST AROM (Degrees)   R L   Extension 75 20   Flexion 80 28   Radial Deviation 20 5   Ulnar Deviation 30 20   Pronation 80 60   Supination 90 20       Stiffness is reported and noted in L shoulder.    Physical Observation: 7 cm intact incision along ulnar FA  Edema: minimal circumferentially throughout L hand, wrist, and FA     Sensory: dulled sensation in L IF tip is reported.   Numbness/Tingling: impaired in L IF tip.      Outcome Measures:  UEFI:24/80     EDUCATION: home exercise program, plan of care, activity modifications, pain management, and injury pathology       Goals:      Plan of care was " developed with input and agreement by the patient    Treatments:     Therapeutic Exercise:   25 min  Therapeutic Exercise  Therapeutic Exercise Performed: Yes  Therapeutic Exercise Activity 1: Wrist E/F x 10 reps (issued for HEP)  Therapeutic Exercise Activity 2: Wrist UD/RD x 10 reps each (issued for HEP)  Therapeutic Exercise Activity 3: FA sup/pron x 10 reps each (issued for HEP)  Therapeutic Exercise Activity 4: Wrist E/F with FA pronation x 10 reps each (issued for HEP)  Therapeutic Exercise Activity 5: Prayer wrist extension x 10 reps (issued for HEP)    Assessment: Patient is a 49 yo female s/p L distal radius and ulna FX's with ORIF resulting in limited participation in pain-free ADLs and inability to perform at their prior level of function. Pt would benefit from occupational therapy to address the impairments found & listed previously in the objective section in order to return to safe and pain-free ADLs and prior level of function.       Clinical Presentation: Stable and/or uncomplicated characteristics       Plan:      Planned Interventions include: therapeutic exercise, therapeutic activity, self-care home management, manual therapy, therapeutic activities, gait training, neuromuscular coordination, vasopneumatic, dry needling, aquatic therapy, electric stimulation, fluidotherapy, ultrasound, kinesiotaping, orthosis fabrication, wound care  Frequency: 1-2 x Week  Duration: 8 Weeks  Rehab potential/prognosis: Colt Cheek OT

## 2025-07-08 ENCOUNTER — TREATMENT (OUTPATIENT)
Dept: OCCUPATIONAL THERAPY | Facility: HOSPITAL | Age: 50
End: 2025-07-08
Payer: COMMERCIAL

## 2025-07-08 DIAGNOSIS — S52.532D CLOSED COLLES' FRACTURE OF LEFT RADIUS WITH ROUTINE HEALING, SUBSEQUENT ENCOUNTER: ICD-10-CM

## 2025-07-08 DIAGNOSIS — S52.502A CLOSED FRACTURE OF DISTAL ENDS OF LEFT RADIUS AND ULNA, INITIAL ENCOUNTER: ICD-10-CM

## 2025-07-08 DIAGNOSIS — S52.602A CLOSED FRACTURE OF DISTAL ENDS OF LEFT RADIUS AND ULNA, INITIAL ENCOUNTER: ICD-10-CM

## 2025-07-08 PROCEDURE — 97110 THERAPEUTIC EXERCISES: CPT | Mod: GO | Performed by: OCCUPATIONAL THERAPIST

## 2025-07-08 ASSESSMENT — PAIN - FUNCTIONAL ASSESSMENT: PAIN_FUNCTIONAL_ASSESSMENT: 0-10

## 2025-07-08 ASSESSMENT — PAIN SCALES - GENERAL: PAINLEVEL_OUTOF10: 2

## 2025-07-08 NOTE — PROGRESS NOTES
"    Occupational Therapy  Occupational Therapy Treatment    Patient Name: Karen Gay  MRN: [id number]  Today's Date: 7/8/2025  Time Calculation  Start Time: 1242  Stop Time: 1330  Time Calculation (min): 48 min      Time:  Time Calculation  Start Time: 1242  Stop Time: 1330  Time Calculation (min): 48 min  OT Therapeutic Procedures Time Entry  Therapeutic Exercise Time Entry: 40  Manual Therapy Time Entry: 8    Insurance:  Visit number: 2 of 16  Authorization info: no authorization  Insurance Type: Payor: [employer] [facility] / Plan: [employer] / Product Type: *No Product type* /    General:  Reason for visit: L distal radius and ulnar FX's with ORIF   Referred by: Nicholas    Current Problem  1. Closed fracture of distal ends of left radius and ulna, initial encounter  Follow Up In Occupational Therapy      2. Closed Colles' fracture of left radius with routine healing, subsequent encounter  Follow Up In Occupational Therapy          Precautions   No WB      Subjective:   Patient reports \"I was cleaning and it seized up.\"    Performing HEP?: Yes    Pain  Pain Assessment: 0-10  0-10 (Numeric) Pain Score: 2  Pain Type: Acute pain  Pain Location: Wrist  Pain Orientation: Left  Pain Radiating Towards: hand  Post-tx pain=0/10    Objective:     AROM: L wrist E/F=55/40    Physical Observation: intact incision   Edema: minimal circumferential edema is noted throughout hand and wrist       Treatments:     Modalities:        Modalities  Modalities Used: Yes  Modality 1: Untimed Fluidotherapy with AROM promotion.     Therapeutic Exercise:   40 min  Therapeutic Exercise  Therapeutic Exercise Performed: Yes  Therapeutic Exercise Activity 1: wrist E/F and UD/RD x 10 reps each  Therapeutic Exercise Activity 2: FA sup/pron x 10 reps each  Therapeutic Exercise Activity 3: wrist E/F with FA pronation x 10 reps  Therapeutic Exercise Activity 4: prayer wrist extension x 10 reps  Therapeutic Exercise Activity 5: Ball " wrist ext/flex x 10 reps each  Therapeutic Exercise Activity 6: PROM with digit flexion and extension x 10 reps each  Therapeutic Exercise Activity 7: therabar digit extension rolls    Manual Therapy:     8 min  Manual Therapy  Manual Therapy Performed: Yes  Manual Therapy Activity 1: scar massage and soft tissue mobilization      Assessment: Improvement is noted with wrist flexion and extension this p.m.        Plan: Continue with plan of care 1-2x/wk.        Luciana Cheek, OT

## 2025-07-11 ENCOUNTER — TREATMENT (OUTPATIENT)
Dept: OCCUPATIONAL THERAPY | Facility: HOSPITAL | Age: 50
End: 2025-07-11
Payer: COMMERCIAL

## 2025-07-11 DIAGNOSIS — S52.502A CLOSED FRACTURE OF DISTAL ENDS OF LEFT RADIUS AND ULNA, INITIAL ENCOUNTER: ICD-10-CM

## 2025-07-11 DIAGNOSIS — S52.532D CLOSED COLLES' FRACTURE OF LEFT RADIUS WITH ROUTINE HEALING, SUBSEQUENT ENCOUNTER: ICD-10-CM

## 2025-07-11 DIAGNOSIS — S52.602A CLOSED FRACTURE OF DISTAL ENDS OF LEFT RADIUS AND ULNA, INITIAL ENCOUNTER: ICD-10-CM

## 2025-07-11 PROCEDURE — 97140 MANUAL THERAPY 1/> REGIONS: CPT | Mod: GO | Performed by: OCCUPATIONAL THERAPIST

## 2025-07-11 PROCEDURE — 97110 THERAPEUTIC EXERCISES: CPT | Mod: GO | Performed by: OCCUPATIONAL THERAPIST

## 2025-07-11 ASSESSMENT — PAIN SCALES - GENERAL: PAINLEVEL_OUTOF10: 5 - MODERATE PAIN

## 2025-07-11 ASSESSMENT — PAIN - FUNCTIONAL ASSESSMENT: PAIN_FUNCTIONAL_ASSESSMENT: 0-10

## 2025-07-11 NOTE — PROGRESS NOTES
"    Occupational Therapy  Occupational Therapy Treatment    Patient Name: Karen Gay  MRN: [id number]  Today's Date: 7/11/2025  Time Calculation  Start Time: 0910  Stop Time: 0950  Time Calculation (min): 40 min      Time:  Time Calculation  Start Time: 0910  Stop Time: 0950  Time Calculation (min): 40 min  OT Therapeutic Procedures Time Entry  Therapeutic Exercise Time Entry: 32  Manual Therapy Time Entry: 8    Insurance:  Visit number: 3 of 16  Authorization info: no authorization   Insurance Type: Payor: [employer] [facility] / Plan: [employer] / Product Type: *No Product type* /    General:  Reason for visit: L distal radius and ulna fx's with ORIF  Referred by: Nicholas    Current Problem  1. Closed fracture of distal ends of left radius and ulna, initial encounter  Follow Up In Occupational Therapy      2. Closed Colles' fracture of left radius with routine healing, subsequent encounter  Follow Up In Occupational Therapy          Precautions   No none      Subjective:   Patient reports \"It seems like I am getting more hair on that arm.\"    Performing HEP?: Yes    Pain  Pain Assessment: 0-10  0-10 (Numeric) Pain Score: 5 - Moderate pain  Pain Type: Chronic pain  Pain Location: Wrist  Pain Orientation: Left  Pain Radiating Towards: hand  Post-tx pain=3/10    Objective:     Patient arrived 10 minutes late to scheduled appointment.     AROM: L wrist extension=54    Physical Observation: intact skin and incision   Edema: minimal circumferential edema of L hand and wrist       Treatments:     Modalities:        Modalities  Modalities Used: Yes  Modality 1: Untimed Fluidotherapy with AROM promotion and sensory retraining     Therapeutic Exercise:   32 min  Therapeutic Exercise  Therapeutic Exercise Performed: Yes  Therapeutic Exercise Activity 1: wrist E/F and UD/RD x 10 reps each  Therapeutic Exercise Activity 2: place and hold digit flexion and extension x 10 reps each  Therapeutic Exercise " Activity 3: therabar digit extension x 10 reps each  Therapeutic Exercise Activity 4: ball wrist mobs x 10 reps each    Manual Therapy:     8 min  Manual Therapy  Manual Therapy Performed: Yes  Manual Therapy Activity 1: scar massage and soft tissue mobilization to all surfaces of L distal FA and wrist with emphasis on AROM promotion    Assessment: Decreased c/o pain following tx.        Plan: Continue with plan of care 1-2x/wk.        Luciana Cheek OT

## 2025-07-14 ENCOUNTER — TREATMENT (OUTPATIENT)
Dept: OCCUPATIONAL THERAPY | Facility: HOSPITAL | Age: 50
End: 2025-07-14
Payer: COMMERCIAL

## 2025-07-14 DIAGNOSIS — S52.602A CLOSED FRACTURE OF DISTAL ENDS OF LEFT RADIUS AND ULNA, INITIAL ENCOUNTER: ICD-10-CM

## 2025-07-14 DIAGNOSIS — S52.532D CLOSED COLLES' FRACTURE OF LEFT RADIUS WITH ROUTINE HEALING, SUBSEQUENT ENCOUNTER: ICD-10-CM

## 2025-07-14 DIAGNOSIS — S52.502A CLOSED FRACTURE OF DISTAL ENDS OF LEFT RADIUS AND ULNA, INITIAL ENCOUNTER: ICD-10-CM

## 2025-07-14 PROCEDURE — 97140 MANUAL THERAPY 1/> REGIONS: CPT | Mod: GO | Performed by: OCCUPATIONAL THERAPIST

## 2025-07-14 PROCEDURE — 97110 THERAPEUTIC EXERCISES: CPT | Mod: GO | Performed by: OCCUPATIONAL THERAPIST

## 2025-07-14 ASSESSMENT — PAIN SCALES - GENERAL: PAINLEVEL_OUTOF10: 5 - MODERATE PAIN

## 2025-07-14 ASSESSMENT — PAIN - FUNCTIONAL ASSESSMENT: PAIN_FUNCTIONAL_ASSESSMENT: 0-10

## 2025-07-14 NOTE — PROGRESS NOTES
"    Occupational Therapy  Occupational Therapy Treatment    Patient Name: Karen Gay  MRN: [id number]  Today's Date: 7/14/2025  Time Calculation  Start Time: 1545  Stop Time: 1638  Time Calculation (min): 53 min      Time:  Time Calculation  Start Time: 1545  Stop Time: 1638  Time Calculation (min): 53 min  OT Therapeutic Procedures Time Entry  Therapeutic Exercise Time Entry: 45  Manual Therapy Time Entry: 8    Insurance:  Visit number: 4 of 16  Authorization info: no authorization   Insurance Type: Payor: [employer] [facility] / Plan: [employer] / Product Type: *No Product type* /    General:  Reason for visit: L distal radius and ulna fx's with ORIF  Referred by: Nicholas    Current Problem  1. Closed fracture of distal ends of left radius and ulna, initial encounter  Follow Up In Occupational Therapy      2. Closed Colles' fracture of left radius with routine healing, subsequent encounter  Follow Up In Occupational Therapy          Precautions   none      Subjective:   Patient reports \"Puttying a pony tail in is much easier.\"    Performing HEP?: Yes    Pain  Pain Assessment: 0-10  0-10 (Numeric) Pain Score: 5 - Moderate pain  Pain Type: Chronic pain  Pain Location: Wrist  Pain Orientation: Left  Pain Radiating Towards: hand  Post-tx pain=0/10    Objective:   strength #2 R/L=60/24#(40%)  Physical Observation: intact skin   Edema: no significant change is noted     Sensory: intact   Numbness/Tingling: none    Treatments:     Modalities:        Modalities  Modalities Used: Yes  Modality 1: Untimed Fluidotherapy with AROM promotion     Therapeutic Exercise:   45 min  Therapeutic Exercise  Therapeutic Exercise Performed: Yes  Therapeutic Exercise Activity 1: wrist E/F and UD/RD x 10 reps  Therapeutic Exercise Activity 2: place and hold flexion and extension  Therapeutic Exercise Activity 3: Wrist E/F with FA sup/pron x 10 reps each with use of 1# weight.  Therapeutic Exercise Activity 4: " digiflex x 10 reps each with yellow-green    Manual Therapy:     8 min  Manual Therapy  Manual Therapy Performed: Yes  Manual Therapy Activity 1: scar massage and soft tissue mobilization to all surfaces of L FA    Assessment: Patient tolerated strengthening progression well.        Plan: Continue with plan of care 1-2x/wk.        Luciana Cheek, OT

## 2025-07-18 ENCOUNTER — TREATMENT (OUTPATIENT)
Dept: OCCUPATIONAL THERAPY | Facility: HOSPITAL | Age: 50
End: 2025-07-18
Payer: COMMERCIAL

## 2025-07-18 DIAGNOSIS — S52.532D CLOSED COLLES' FRACTURE OF LEFT RADIUS WITH ROUTINE HEALING, SUBSEQUENT ENCOUNTER: ICD-10-CM

## 2025-07-18 DIAGNOSIS — S52.502A CLOSED FRACTURE OF DISTAL ENDS OF LEFT RADIUS AND ULNA, INITIAL ENCOUNTER: ICD-10-CM

## 2025-07-18 DIAGNOSIS — S52.602A CLOSED FRACTURE OF DISTAL ENDS OF LEFT RADIUS AND ULNA, INITIAL ENCOUNTER: ICD-10-CM

## 2025-07-18 PROCEDURE — 97110 THERAPEUTIC EXERCISES: CPT | Mod: GO | Performed by: OCCUPATIONAL THERAPIST

## 2025-07-18 PROCEDURE — 97140 MANUAL THERAPY 1/> REGIONS: CPT | Mod: GO | Performed by: OCCUPATIONAL THERAPIST

## 2025-07-18 ASSESSMENT — PAIN - FUNCTIONAL ASSESSMENT: PAIN_FUNCTIONAL_ASSESSMENT: 0-10

## 2025-07-18 ASSESSMENT — PAIN SCALES - GENERAL: PAINLEVEL_OUTOF10: 5 - MODERATE PAIN

## 2025-07-18 NOTE — PROGRESS NOTES
"    Occupational Therapy  Occupational Therapy Treatment    Patient Name: Karen Gay  MRN: [id number]  Today's Date: 7/18/2025  Time Calculation  Start Time: 1415  Stop Time: 1515  Time Calculation (min): 60 min      Time:  Time Calculation  Start Time: 1415  Stop Time: 1515  Time Calculation (min): 60 min  OT Therapeutic Procedures Time Entry  Therapeutic Exercise Time Entry: 52  Manual Therapy Time Entry: 8    Insurance:  Visit number: 5 of 16  Authorization info: no authorization   Insurance Type: Payor: MEDICAL [facility] / Plan: [employer] / Product Type: *No Product type* /    General:  Reason for visit: L distal radius and ulna FX's with ORIF  Referred by: Nicholas    Current Problem  1. Closed fracture of distal ends of left radius and ulna, initial encounter  Follow Up In Occupational Therapy      2. Closed Colles' fracture of left radius with routine healing, subsequent encounter  Follow Up In Occupational Therapy          Precautions   none      Subjective:   Patient reports \"My whole arm is stiff.\" \"I'm doing left hand typing.\"    Performing HEP?: Yes    Pain  Pain Assessment: 0-10  0-10 (Numeric) Pain Score: 5 - Moderate pain  Pain Type: Chronic pain  Pain Location: Wrist  Pain Orientation: Left  Post tx pain=0/10    Objective:     AROM: L wrist E/F=52/40    Physical Observation: intact incision   Edema: no significant change       Treatments:     Modalities:        Modalities  Modalities Used: Yes  Modality 1: Untimed Fluidotherapy with AROM promotion     Therapeutic Exercise:  52 min  Therapeutic Exercise  Therapeutic Exercise Performed: Yes  Therapeutic Exercise Activity 1: Wrist AROM x 10 reps with and without use of 1# weight.  Therapeutic Exercise Activity 2: light cherise FA sup/pron x 10 reps each  Therapeutic Exercise Activity 3: place and hold digit flexion and extension x 10 reps each  Therapeutic Exercise Activity 4: digiflex x 10 reps each with yellow-blue  Therapeutic " Exercise Activity 5: overhead pulleys x 10 reps with shoulder flexion and abduction x 10 reps each  Therapeutic Exercise Activity 6: wall washes x 10 reps each with flexion and abduction    Manual Therapy:     8 min  Manual Therapy  Manual Therapy Performed: Yes  Manual Therapy Activity 1: scar massage and soft tissue mobilization      Assessment: Improved flexibility is note with all exercises.       Plan: Continue with plan of care 1-2x/wk.        Luciana Cheek OT

## 2025-07-21 ENCOUNTER — TREATMENT (OUTPATIENT)
Dept: OCCUPATIONAL THERAPY | Facility: HOSPITAL | Age: 50
End: 2025-07-21
Payer: COMMERCIAL

## 2025-07-21 DIAGNOSIS — S52.532D CLOSED COLLES' FRACTURE OF LEFT RADIUS WITH ROUTINE HEALING, SUBSEQUENT ENCOUNTER: ICD-10-CM

## 2025-07-21 DIAGNOSIS — S52.502A CLOSED FRACTURE OF DISTAL ENDS OF LEFT RADIUS AND ULNA, INITIAL ENCOUNTER: ICD-10-CM

## 2025-07-21 DIAGNOSIS — S52.602A CLOSED FRACTURE OF DISTAL ENDS OF LEFT RADIUS AND ULNA, INITIAL ENCOUNTER: ICD-10-CM

## 2025-07-21 PROCEDURE — 97110 THERAPEUTIC EXERCISES: CPT | Mod: GO | Performed by: OCCUPATIONAL THERAPIST

## 2025-07-21 ASSESSMENT — PAIN - FUNCTIONAL ASSESSMENT: PAIN_FUNCTIONAL_ASSESSMENT: 0-10

## 2025-07-21 ASSESSMENT — PAIN SCALES - GENERAL: PAINLEVEL_OUTOF10: 2

## 2025-07-21 NOTE — PROGRESS NOTES
"    Occupational Therapy  Occupational Therapy Treatment    Patient Name: Karen Gay  MRN: [id number]  Today's Date: 7/21/2025  Time Calculation  Start Time: 1245  Stop Time: 1330  Time Calculation (min): 45 min    Time:  Time Calculation  Start Time: 1245  Stop Time: 1330  Time Calculation (min): 45 min  OT Therapeutic Procedures Time Entry  Therapeutic Exercise Time Entry: 45    Insurance:  Visit number: 6 of 16  Authorization info: no authorization   Insurance Type: Payor: MEDICAL [facility] / Plan: [employer] / Product Type: *No Product type* /    General:  Reason for visit: L distal radius & ulna FX's with ORIF  Referred by: Nicholas    Current Problem  1. Closed fracture of distal ends of left radius and ulna, initial encounter  Follow Up In Occupational Therapy      2. Closed Colles' fracture of left radius with routine healing, subsequent encounter  Follow Up In Occupational Therapy          Precautions   none      Subjective:   Patient reports \"things don't hurt as much today.\"    Performing HEP?: Yes    Pain  Pain Assessment: 0-10  0-10 (Numeric) Pain Score: 2  Pain Type: Chronic pain  Pain Location: Wrist  Pain Orientation: Left  Pain Radiating Towards: shoulder, hand  Post-tx pain=2/10    Objective:      strength #2 R/L=58/26#    Physical Observation: intact skin   Edema: none        Treatments:     Modalities:        Modalities  Modalities Used: Yes  Modality 1: Untimed Fluidotherapy  Modality 2: Untimed Hotpack to L shoulder during fluido to wrist and hand with AROM promotion     Therapeutic Exercise:   45 min  Therapeutic Exercise  Therapeutic Exercise Performed: Yes  Therapeutic Exercise Activity 1: wrist and FA AROM x 15 reps each with and without use of 1# weight  Therapeutic Exercise Activity 2: soft theraputty x 10 reps each with full , ext roll, ext ring, opposition, and key pinch (soft theraputty and 5 exercises were issued for HEP)  Therapeutic Exercise Activity 3: " overhead pulleys x 15 reps each  Therapeutic Exercise Activity 4: wall wahses x 15 reps each      Assessment: TX session was shortened due to patient needing to leave for another appointment.        Plan: Continue with plan of care 1-2x/wk.        Luciana Cheek, OT

## 2025-07-29 ENCOUNTER — TREATMENT (OUTPATIENT)
Dept: OCCUPATIONAL THERAPY | Facility: HOSPITAL | Age: 50
End: 2025-07-29
Payer: COMMERCIAL

## 2025-07-29 DIAGNOSIS — S52.532D CLOSED COLLES' FRACTURE OF LEFT RADIUS WITH ROUTINE HEALING, SUBSEQUENT ENCOUNTER: ICD-10-CM

## 2025-07-29 DIAGNOSIS — S52.602A CLOSED FRACTURE OF DISTAL ENDS OF LEFT RADIUS AND ULNA, INITIAL ENCOUNTER: ICD-10-CM

## 2025-07-29 DIAGNOSIS — S52.502A CLOSED FRACTURE OF DISTAL ENDS OF LEFT RADIUS AND ULNA, INITIAL ENCOUNTER: ICD-10-CM

## 2025-07-29 PROCEDURE — 97110 THERAPEUTIC EXERCISES: CPT | Mod: GO | Performed by: OCCUPATIONAL THERAPIST

## 2025-07-29 ASSESSMENT — PAIN SCALES - GENERAL: PAINLEVEL_OUTOF10: 0 - NO PAIN

## 2025-07-29 ASSESSMENT — PAIN - FUNCTIONAL ASSESSMENT: PAIN_FUNCTIONAL_ASSESSMENT: 0-10

## 2025-07-29 NOTE — PROGRESS NOTES
"    Occupational Therapy  Occupational Therapy Treatment    Patient Name: Karen Gay  MRN: [id number]  Today's Date: 7/29/2025  Time Calculation  Start Time: 0900  Stop Time: 0955  Time Calculation (min): 55 min    Time:  Time Calculation  Start Time: 0900  Stop Time: 0955  Time Calculation (min): 55 min  OT Therapeutic Procedures Time Entry  Therapeutic Exercise Time Entry: 55    Insurance:  Visit number: 7 of 16  Authorization info: no authorization   Insurance Type: Payor: MEDICAL [facility] / Plan: [employer] / Product Type: *No Product type* /    General:  Reason for visit: L distal radius and ulna FX's with ORIF  Referred by: Nicholas    Current Problem  1. Closed fracture of distal ends of left radius and ulna, initial encounter  Follow Up In Occupational Therapy      2. Closed Colles' fracture of left radius with routine healing, subsequent encounter  Follow Up In Occupational Therapy          Precautions   none      Subjective:   Patient reports \"My hand was more swollen with the altitude.\"(RE: [place] vacation)    Performing HEP?: Yes    Pain  Pain Assessment: 0-10  0-10 (Numeric) Pain Score: 0 - No pain  Post-tx pain=3/10(shoulder)    Objective:      strength #2 R/L=60/32#    Physical Observation: intact incisions   Edema: minimal circumferential wrist edema is noted.         Treatments:     Modalities:        Modalities  Modalities Used: Yes  Modality 1: Untimed Fluidotherapy  Modality 2: Untimed Hotpack    Therapeutic Exercise:   55 min  Therapeutic Exercise  Therapeutic Exercise Performed: Yes  Therapeutic Exercise Activity 1: wrist and FA mobs with use of 1# weight x 20 reps each  Therapeutic Exercise Activity 2: yellow theraputty x 10 reps with full , ext roll, ext ring, opposition, and key pinch  Therapeutic Exercise Activity 3: overhead pulleys x 10 reps each with shoulder flexion, abduction, and IR      Assessment: Progress is noted with functional  strength this " jaswant        Plan: Continue with plan of care 1-2x/wk       Luciana Cheek, OT

## 2025-07-31 ENCOUNTER — TREATMENT (OUTPATIENT)
Dept: OCCUPATIONAL THERAPY | Facility: HOSPITAL | Age: 50
End: 2025-07-31
Payer: COMMERCIAL

## 2025-07-31 DIAGNOSIS — S52.602A CLOSED FRACTURE OF DISTAL ENDS OF LEFT RADIUS AND ULNA, INITIAL ENCOUNTER: ICD-10-CM

## 2025-07-31 DIAGNOSIS — S52.532D CLOSED COLLES' FRACTURE OF LEFT RADIUS WITH ROUTINE HEALING, SUBSEQUENT ENCOUNTER: ICD-10-CM

## 2025-07-31 DIAGNOSIS — S52.502A CLOSED FRACTURE OF DISTAL ENDS OF LEFT RADIUS AND ULNA, INITIAL ENCOUNTER: ICD-10-CM

## 2025-07-31 PROCEDURE — 97110 THERAPEUTIC EXERCISES: CPT | Mod: GO | Performed by: OCCUPATIONAL THERAPIST

## 2025-07-31 ASSESSMENT — PAIN - FUNCTIONAL ASSESSMENT: PAIN_FUNCTIONAL_ASSESSMENT: 0-10

## 2025-07-31 ASSESSMENT — PAIN SCALES - GENERAL: PAINLEVEL_OUTOF10: 2

## 2025-07-31 NOTE — PROGRESS NOTES
"    Occupational Therapy  Occupational Therapy Treatment    Patient Name: Karen Gay  MRN: [id number]  Today's Date: 7/31/2025  Time Calculation  Start Time: 0825  Stop Time: 0918  Time Calculation (min): 53 min      Time:  Time Calculation  Start Time: 0825  Stop Time: 0918  Time Calculation (min): 53 min  OT Therapeutic Procedures Time Entry  Therapeutic Exercise Time Entry: 53    Insurance:  Visit number: 8 of 16  Authorization info: no authorization   Insurance Type: Payor: MEDICAL [facility] / Plan: [employer] / Product Type: *No Product type* /    General:  Reason for visit: L distal radius and ulna FX's with ORIF  Referred by: Nicholas    Current Problem  1. Closed fracture of distal ends of left radius and ulna, initial encounter  Follow Up In Occupational Therapy      2. Closed Colles' fracture of left radius with routine healing, subsequent encounter  Follow Up In Occupational Therapy          Precautions   none      Subjective:   Patient reports \"I can tell that I am moving it more.\"    Performing HEP?: Yes    Pain  Pain Assessment: 0-10  0-10 (Numeric) Pain Score: 2  Pain Type: Chronic pain  Pain Location: Wrist  Pain Orientation: Left  Pain Radiating Towards: shoulder, hand  Post-tx pain=2/10    Objective:     Patient arrived 10 minutes late to scheduled appointment    Physical Observation: intact   Edema: none    Sensory: intact   Numbness/Tingling: none    Treatments:     Modalities:        Modalities  Modalities Used: Yes  Modality 1: Untimed Fluidotherapy  Modality 2: Untimed Hotpack to L shoulder     Therapeutic Exercise:   53 min  Therapeutic Exercise  Therapeutic Exercise Performed: Yes  Therapeutic Exercise Activity 1: wrist and FA mobilization with use of 1# weight  Therapeutic Exercise Activity 2: yellow theraputty x 10 reps each  Therapeutic Exercise Activity 3: overhead pulleys x 15 reps each including IR  Therapeutic Exercise Activity 4: wall washes x 10 reps " each      Assessment: good tolerance with all exercises x 10 reps this a.m.        Plan: Continue with plan of care 1-2x/wk.       Luciana Cheek, OT

## 2025-08-04 ENCOUNTER — TREATMENT (OUTPATIENT)
Dept: OCCUPATIONAL THERAPY | Facility: HOSPITAL | Age: 50
End: 2025-08-04
Payer: COMMERCIAL

## 2025-08-04 DIAGNOSIS — S52.532D CLOSED COLLES' FRACTURE OF LEFT RADIUS WITH ROUTINE HEALING, SUBSEQUENT ENCOUNTER: ICD-10-CM

## 2025-08-04 DIAGNOSIS — S52.502A CLOSED FRACTURE OF DISTAL ENDS OF LEFT RADIUS AND ULNA, INITIAL ENCOUNTER: ICD-10-CM

## 2025-08-04 DIAGNOSIS — S52.602A CLOSED FRACTURE OF DISTAL ENDS OF LEFT RADIUS AND ULNA, INITIAL ENCOUNTER: ICD-10-CM

## 2025-08-04 PROCEDURE — 97110 THERAPEUTIC EXERCISES: CPT | Mod: GO | Performed by: OCCUPATIONAL THERAPIST

## 2025-08-04 ASSESSMENT — PAIN - FUNCTIONAL ASSESSMENT: PAIN_FUNCTIONAL_ASSESSMENT: 0-10

## 2025-08-04 ASSESSMENT — PAIN SCALES - GENERAL: PAINLEVEL_OUTOF10: 2

## 2025-08-04 NOTE — PROGRESS NOTES
"    Occupational Therapy  Occupational Therapy Treatment    Patient Name: Karen Gay  MRN: [id number]  Today's Date: 8/4/2025  Time Calculation  Start Time: 1245  Stop Time: 1340  Time Calculation (min): 55 min      Time:  Time Calculation  Start Time: 1245  Stop Time: 1340  Time Calculation (min): 55 min  OT Therapeutic Procedures Time Entry  Therapeutic Exercise Time Entry: 55    Insurance:  Visit number: 9 of 16  Authorization info: no authorization   Insurance Type: Payor: MEDICAL [facility] / Plan: [employer] / Product Type: *No Product type* /    General:  Reason for visit: L distal radius and ulna FX's with ORIF  Referred by: Nicholas    Current Problem  1. Closed fracture of distal ends of left radius and ulna, initial encounter  Follow Up In Occupational Therapy      2. Closed Colles' fracture of left radius with routine healing, subsequent encounter  Follow Up In Occupational Therapy          Precautions   none      Subjective:   Patient reports \"I am trying to use it more.\"    Performing HEP?: Yes    Pain  Pain Assessment: 0-10  0-10 (Numeric) Pain Score: 2  Pain Type: Chronic pain  Pain Location: Elbow  Pain Orientation: Left  Pain Radiating Towards: shoulder, hand  Post-tx pain=2/10    Objective:     AROM: L shoulder [id number] with standing     Physical Observation: intact skin   Edema: no significant change       Treatments:     Modalities:        Modalities  Modalities Used: Yes  Modality 1: Untimed Fluidotherapy  Modality 2: Untimed Hotpack to shoulder     Therapeutic Exercise:   55 min  Therapeutic Exercise  Therapeutic Exercise Performed: Yes  Therapeutic Exercise Activity 1: wrist and FA mobs with use of 1# & 2# weights  Therapeutic Exercise Activity 2: soft/ medium-soft theraputty x 15 reps each (medium-soft theraputty issued for HEP)  Therapeutic Exercise Activity 3: overhead pulleys x 20 reps each including IR  Therapeutic Exercise Activity 4: wall washes x 15 reps " each    Assessment: Progress is noted with shoulder active flexion this p.m.        Plan: Continue with plan of care 1-2x/wk       Luciana Cheek OT

## 2025-08-05 ENCOUNTER — HOSPITAL ENCOUNTER (OUTPATIENT)
Dept: RADIOLOGY | Facility: CLINIC | Age: 50
Discharge: HOME | End: 2025-08-05
Payer: COMMERCIAL

## 2025-08-05 ENCOUNTER — APPOINTMENT (OUTPATIENT)
Dept: ORTHOPEDIC SURGERY | Facility: CLINIC | Age: 50
End: 2025-08-05
Payer: COMMERCIAL

## 2025-08-05 DIAGNOSIS — S52.502A CLOSED FRACTURE OF DISTAL ENDS OF LEFT RADIUS AND ULNA, INITIAL ENCOUNTER: ICD-10-CM

## 2025-08-05 DIAGNOSIS — S52.602A CLOSED FRACTURE OF DISTAL ENDS OF LEFT RADIUS AND ULNA, INITIAL ENCOUNTER: ICD-10-CM

## 2025-08-05 PROCEDURE — 73110 X-RAY EXAM OF WRIST: CPT | Mod: LT

## 2025-08-05 PROCEDURE — 99024 POSTOP FOLLOW-UP VISIT: CPT | Performed by: ORTHOPAEDIC SURGERY

## 2025-08-05 PROCEDURE — 99212 OFFICE O/P EST SF 10 MIN: CPT

## 2025-08-05 PROCEDURE — 1036F TOBACCO NON-USER: CPT | Performed by: ORTHOPAEDIC SURGERY

## 2025-08-05 PROCEDURE — 73110 X-RAY EXAM OF WRIST: CPT | Mod: LEFT SIDE | Performed by: RADIOLOGY

## 2025-08-05 NOTE — PROGRESS NOTES
Follow-up fixation left distal radius fracture is working in therapy and motion is improving  Examination prominent distal ulna still quite stiff with dorsiflexion x-rays show healed fracture assessment clinically healed plan aggressive therapy discussed expectations follow-up as needed

## 2025-08-08 ENCOUNTER — TREATMENT (OUTPATIENT)
Dept: OCCUPATIONAL THERAPY | Facility: HOSPITAL | Age: 50
End: 2025-08-08
Payer: COMMERCIAL

## 2025-08-08 DIAGNOSIS — S52.502A CLOSED FRACTURE OF DISTAL ENDS OF LEFT RADIUS AND ULNA, INITIAL ENCOUNTER: ICD-10-CM

## 2025-08-08 DIAGNOSIS — S52.602A CLOSED FRACTURE OF DISTAL ENDS OF LEFT RADIUS AND ULNA, INITIAL ENCOUNTER: ICD-10-CM

## 2025-08-08 DIAGNOSIS — S52.532D CLOSED COLLES' FRACTURE OF LEFT RADIUS WITH ROUTINE HEALING, SUBSEQUENT ENCOUNTER: ICD-10-CM

## 2025-08-08 PROCEDURE — 97110 THERAPEUTIC EXERCISES: CPT | Mod: GO | Performed by: OCCUPATIONAL THERAPIST

## 2025-08-08 ASSESSMENT — PAIN SCALES - GENERAL: PAINLEVEL_OUTOF10: 2

## 2025-08-08 ASSESSMENT — PAIN - FUNCTIONAL ASSESSMENT: PAIN_FUNCTIONAL_ASSESSMENT: 0-10

## 2025-08-08 NOTE — PROGRESS NOTES
"    Occupational Therapy  Occupational Therapy Orthopedic Progress Note    Patient Name: Karen Gay  MRN: [id number]  Today's Date: 8/8/2025  Time Calculation  Start Time: 0945  Stop Time: 1040  Time Calculation (min): 55 min    Time:  Time Calculation  Start Time: 0945  Stop Time: 1040  Time Calculation (min): 55 min  OT Therapeutic Procedures Time Entry  Therapeutic Exercise Time Entry: 55    Insurance:  Visit number: 10 of 16  Authorization info: no authorization   Insurance Type: Payor: [employer] [facility] [place] / Plan: [employer] / Product Type: *No Product type* /      General:  Reason for visit: L distal radius and ulna FX's with ORIF  Referred by: Nicholas    Current Problem  1. Closed fracture of distal ends of left radius and ulna, initial encounter  Follow Up In Occupational Therapy      2. Closed Colles' fracture of left radius with routine healing, subsequent encounter  Follow Up In Occupational Therapy          Precautions: none         Subjective:  Patient reports \"I am still pretty stiff.\"    Current Condition:  Better    Pain:  Pain Assessment: 0-10  0-10 (Numeric) Pain Score: 2  Pain Type: Chronic pain  Pain Location: Elbow  Pain Orientation: Left  Pain Radiating Towards: shoulder, hand  Aggravating Factors: end ranges of AROM  Relieving Factors:  Rest and Heat     Self Reported Function (0-100%) = 60%  - 100% being back to PLOF    Objective:    AROM: L wrist E/F=60/45, UD/RD=25/20, FA sup/pron=80/70   Lags to DPC: IF=1 cm, LF=1 cm, RF=1 cm, and SF=1 cm    Shoulder htjynoe=227, sbhpuvmqm=606, ER=80, & IR=35   strength #2 R/L=64/35#    Outcome Measures: Updated 8/8/2025  UEFI: 51/80    EDUCATION: home exercise program, plan of care, activity modifications, pain management, and injury pathology       Goals: Updated 8/8/2025  Active       OT Goals       Patient is independent with all home exercises.  (Met)       Start:  07/11/25    Expected End:  08/10/25    Resolved:  08/08/25    "      Patient c/o 2/10 or less pain in L distal UE with use during home exercises and ADL's. (Progressing)       Start:  07/11/25    Expected End:  09/07/25            AROM: L wrist extension=50 degrees or more to assist with ADL completion.  (Met)       Start:  07/11/25    Expected End:  08/10/25    Resolved:  08/08/25         L distal UE AROM is sufficient for independent completion of all ADL's and home management.  (Progressing)       Start:  07/11/25    Expected End:  09/09/25            L distal UE strength is sufficient for independent completion of all ADL's and home management.  (Progressing)       Start:  07/11/25    Expected End:  09/09/25                Plan of care was developed with input and agreement by the patient    Treatments:     Modalities:        Modalities  Modalities Used: Yes  Modality 1: Untimed Fluidotherapy  Modality 2: Untimed Hotpack to L shoulder     Therapeutic Exercise:    55 min  Therapeutic Exercise  Therapeutic Exercise Performed: Yes  Therapeutic Exercise Activity 1: wrist and FA mobs x 20 reps with 2# weight  Therapeutic Exercise Activity 2: medium soft theraputty x 10 reps each with inclusion of wrist extension/weightbearing  Therapeutic Exercise Activity 3: overhead pulleys x 20 reps each  Therapeutic Exercise Activity 4: wall washes x 20 reps each      Assessment: Progress is noted in all areas of AROM, strength, pain control, and independence with daily function. Recommend OT to continue to maximize rehab potential.        Plan:      Planned Interventions include: therapeutic exercise, therapeutic activity, self-care home management, manual therapy, therapeutic activities, gait training, neuromuscular coordination, vasopneumatic, dry needling, aquatic therapy, electric stimulation, fluidotherapy, ultrasound, kinesiotaping, orthosis fabrication, wound care  Frequency: 1-2 x Week  Duration: 4 Weeks    Luciana Cheek OT

## 2025-08-11 ENCOUNTER — TREATMENT (OUTPATIENT)
Dept: OCCUPATIONAL THERAPY | Facility: HOSPITAL | Age: 50
End: 2025-08-11
Payer: COMMERCIAL

## 2025-08-11 DIAGNOSIS — S52.502A CLOSED FRACTURE OF DISTAL ENDS OF LEFT RADIUS AND ULNA, INITIAL ENCOUNTER: ICD-10-CM

## 2025-08-11 DIAGNOSIS — S52.532D CLOSED COLLES' FRACTURE OF LEFT RADIUS WITH ROUTINE HEALING, SUBSEQUENT ENCOUNTER: ICD-10-CM

## 2025-08-11 DIAGNOSIS — S52.602A CLOSED FRACTURE OF DISTAL ENDS OF LEFT RADIUS AND ULNA, INITIAL ENCOUNTER: ICD-10-CM

## 2025-08-11 PROCEDURE — 97140 MANUAL THERAPY 1/> REGIONS: CPT | Mod: GO | Performed by: OCCUPATIONAL THERAPIST

## 2025-08-11 PROCEDURE — 97110 THERAPEUTIC EXERCISES: CPT | Mod: GO | Performed by: OCCUPATIONAL THERAPIST

## 2025-08-11 ASSESSMENT — PAIN SCALES - GENERAL: PAINLEVEL_OUTOF10: 1

## 2025-08-11 ASSESSMENT — PAIN - FUNCTIONAL ASSESSMENT: PAIN_FUNCTIONAL_ASSESSMENT: 0-10

## 2025-08-14 ENCOUNTER — TREATMENT (OUTPATIENT)
Dept: OCCUPATIONAL THERAPY | Facility: HOSPITAL | Age: 50
End: 2025-08-14
Payer: COMMERCIAL

## 2025-08-14 DIAGNOSIS — S52.532D CLOSED COLLES' FRACTURE OF LEFT RADIUS WITH ROUTINE HEALING, SUBSEQUENT ENCOUNTER: ICD-10-CM

## 2025-08-14 DIAGNOSIS — S52.502A CLOSED FRACTURE OF DISTAL ENDS OF LEFT RADIUS AND ULNA, INITIAL ENCOUNTER: ICD-10-CM

## 2025-08-14 DIAGNOSIS — S52.602A CLOSED FRACTURE OF DISTAL ENDS OF LEFT RADIUS AND ULNA, INITIAL ENCOUNTER: ICD-10-CM

## 2025-08-14 PROCEDURE — 97140 MANUAL THERAPY 1/> REGIONS: CPT | Mod: GO | Performed by: OCCUPATIONAL THERAPIST

## 2025-08-14 PROCEDURE — 97110 THERAPEUTIC EXERCISES: CPT | Mod: GO | Performed by: OCCUPATIONAL THERAPIST

## 2025-08-14 ASSESSMENT — PAIN - FUNCTIONAL ASSESSMENT: PAIN_FUNCTIONAL_ASSESSMENT: 0-10

## 2025-08-14 ASSESSMENT — PAIN SCALES - GENERAL: PAINLEVEL_OUTOF10: 0 - NO PAIN

## 2025-08-18 ENCOUNTER — TREATMENT (OUTPATIENT)
Dept: OCCUPATIONAL THERAPY | Facility: HOSPITAL | Age: 50
End: 2025-08-18
Payer: COMMERCIAL

## 2025-08-18 DIAGNOSIS — S52.602A CLOSED FRACTURE OF DISTAL ENDS OF LEFT RADIUS AND ULNA, INITIAL ENCOUNTER: ICD-10-CM

## 2025-08-18 DIAGNOSIS — S52.502A CLOSED FRACTURE OF DISTAL ENDS OF LEFT RADIUS AND ULNA, INITIAL ENCOUNTER: ICD-10-CM

## 2025-08-18 DIAGNOSIS — S52.532D CLOSED COLLES' FRACTURE OF LEFT RADIUS WITH ROUTINE HEALING, SUBSEQUENT ENCOUNTER: ICD-10-CM

## 2025-08-18 PROCEDURE — 97140 MANUAL THERAPY 1/> REGIONS: CPT | Mod: GO | Performed by: OCCUPATIONAL THERAPIST

## 2025-08-18 PROCEDURE — 97110 THERAPEUTIC EXERCISES: CPT | Mod: GO | Performed by: OCCUPATIONAL THERAPIST

## 2025-08-18 ASSESSMENT — PAIN - FUNCTIONAL ASSESSMENT: PAIN_FUNCTIONAL_ASSESSMENT: 0-10

## 2025-08-18 ASSESSMENT — PAIN SCALES - GENERAL: PAINLEVEL_OUTOF10: 0 - NO PAIN

## 2025-08-21 ENCOUNTER — TREATMENT (OUTPATIENT)
Dept: OCCUPATIONAL THERAPY | Facility: HOSPITAL | Age: 50
End: 2025-08-21
Payer: COMMERCIAL

## 2025-08-21 DIAGNOSIS — S52.502A CLOSED FRACTURE OF DISTAL ENDS OF LEFT RADIUS AND ULNA, INITIAL ENCOUNTER: ICD-10-CM

## 2025-08-21 DIAGNOSIS — S52.532D CLOSED COLLES' FRACTURE OF LEFT RADIUS WITH ROUTINE HEALING, SUBSEQUENT ENCOUNTER: ICD-10-CM

## 2025-08-21 DIAGNOSIS — S52.602A CLOSED FRACTURE OF DISTAL ENDS OF LEFT RADIUS AND ULNA, INITIAL ENCOUNTER: ICD-10-CM

## 2025-08-21 PROCEDURE — 97110 THERAPEUTIC EXERCISES: CPT | Mod: GO | Performed by: OCCUPATIONAL THERAPIST

## 2025-08-21 PROCEDURE — 97140 MANUAL THERAPY 1/> REGIONS: CPT | Mod: GO | Performed by: OCCUPATIONAL THERAPIST

## 2025-08-21 ASSESSMENT — PAIN SCALES - GENERAL: PAINLEVEL_OUTOF10: 0 - NO PAIN

## 2025-08-21 ASSESSMENT — PAIN - FUNCTIONAL ASSESSMENT: PAIN_FUNCTIONAL_ASSESSMENT: 0-10

## 2025-08-26 ENCOUNTER — TREATMENT (OUTPATIENT)
Dept: OCCUPATIONAL THERAPY | Facility: HOSPITAL | Age: 50
End: 2025-08-26
Payer: COMMERCIAL

## 2025-08-26 DIAGNOSIS — S52.602A CLOSED FRACTURE OF DISTAL ENDS OF LEFT RADIUS AND ULNA, INITIAL ENCOUNTER: ICD-10-CM

## 2025-08-26 DIAGNOSIS — S52.502A CLOSED FRACTURE OF DISTAL ENDS OF LEFT RADIUS AND ULNA, INITIAL ENCOUNTER: ICD-10-CM

## 2025-08-26 DIAGNOSIS — S52.532D CLOSED COLLES' FRACTURE OF LEFT RADIUS WITH ROUTINE HEALING, SUBSEQUENT ENCOUNTER: ICD-10-CM

## 2025-08-26 PROCEDURE — 97110 THERAPEUTIC EXERCISES: CPT | Mod: GO | Performed by: OCCUPATIONAL THERAPIST

## 2025-08-26 PROCEDURE — 97140 MANUAL THERAPY 1/> REGIONS: CPT | Mod: GO | Performed by: OCCUPATIONAL THERAPIST

## 2025-08-26 ASSESSMENT — PAIN SCALES - GENERAL: PAINLEVEL_OUTOF10: 0 - NO PAIN

## 2025-08-26 ASSESSMENT — PAIN - FUNCTIONAL ASSESSMENT: PAIN_FUNCTIONAL_ASSESSMENT: 0-10

## 2025-08-28 ENCOUNTER — TREATMENT (OUTPATIENT)
Dept: OCCUPATIONAL THERAPY | Facility: HOSPITAL | Age: 50
End: 2025-08-28
Payer: COMMERCIAL

## 2025-08-28 DIAGNOSIS — S52.602A CLOSED FRACTURE OF DISTAL ENDS OF LEFT RADIUS AND ULNA, INITIAL ENCOUNTER: ICD-10-CM

## 2025-08-28 DIAGNOSIS — S52.502A CLOSED FRACTURE OF DISTAL ENDS OF LEFT RADIUS AND ULNA, INITIAL ENCOUNTER: ICD-10-CM

## 2025-08-28 DIAGNOSIS — S52.532D CLOSED COLLES' FRACTURE OF LEFT RADIUS WITH ROUTINE HEALING, SUBSEQUENT ENCOUNTER: ICD-10-CM

## 2025-08-28 PROCEDURE — 97140 MANUAL THERAPY 1/> REGIONS: CPT | Mod: GO | Performed by: OCCUPATIONAL THERAPIST

## 2025-08-28 PROCEDURE — 97110 THERAPEUTIC EXERCISES: CPT | Mod: GO | Performed by: OCCUPATIONAL THERAPIST

## 2025-08-28 ASSESSMENT — PAIN SCALES - GENERAL: PAINLEVEL_OUTOF10: 0 - NO PAIN

## 2025-08-28 ASSESSMENT — PAIN - FUNCTIONAL ASSESSMENT: PAIN_FUNCTIONAL_ASSESSMENT: 0-10

## 2025-09-03 ENCOUNTER — HOSPITAL ENCOUNTER (OUTPATIENT)
Dept: RADIOLOGY | Facility: CLINIC | Age: 50
Discharge: HOME | End: 2025-09-03
Payer: COMMERCIAL

## 2025-09-03 ENCOUNTER — OFFICE VISIT (OUTPATIENT)
Dept: URGENT CARE | Age: 50
End: 2025-09-03
Payer: COMMERCIAL

## 2025-09-03 ENCOUNTER — OFFICE VISIT (OUTPATIENT)
Dept: PRIMARY CARE | Facility: CLINIC | Age: 50
End: 2025-09-03
Payer: COMMERCIAL

## 2025-09-03 VITALS
RESPIRATION RATE: 16 BRPM | HEART RATE: 90 BPM | OXYGEN SATURATION: 98 % | SYSTOLIC BLOOD PRESSURE: 124 MMHG | TEMPERATURE: 98.3 F | DIASTOLIC BLOOD PRESSURE: 81 MMHG

## 2025-09-03 VITALS
SYSTOLIC BLOOD PRESSURE: 133 MMHG | WEIGHT: 111.4 LBS | DIASTOLIC BLOOD PRESSURE: 85 MMHG | HEART RATE: 88 BPM | HEIGHT: 64 IN | BODY MASS INDEX: 19.02 KG/M2 | OXYGEN SATURATION: 98 %

## 2025-09-03 DIAGNOSIS — R10.84 GENERALIZED ABDOMINAL PAIN: ICD-10-CM

## 2025-09-03 DIAGNOSIS — R14.0 ABDOMINAL BLOATING: ICD-10-CM

## 2025-09-03 DIAGNOSIS — R11.2 NAUSEA AND VOMITING, UNSPECIFIED VOMITING TYPE: ICD-10-CM

## 2025-09-03 DIAGNOSIS — R10.84 GENERALIZED ABDOMINAL PAIN: Primary | ICD-10-CM

## 2025-09-03 LAB
POC APPEARANCE, URINE: CLEAR
POC BILIRUBIN, URINE: ABNORMAL
POC BLOOD, URINE: NEGATIVE
POC COLOR, URINE: ABNORMAL
POC GLUCOSE, URINE: NEGATIVE MG/DL
POC KETONES, URINE: NEGATIVE MG/DL
POC LEUKOCYTES, URINE: ABNORMAL
POC NITRITE,URINE: NEGATIVE
POC PH, URINE: >=9 PH
POC PROTEIN, URINE: ABNORMAL MG/DL
POC SPECIFIC GRAVITY, URINE: <=1.005
POC UROBILINOGEN, URINE: 0.2 EU/DL
PREGNANCY TEST URINE, POC: NEGATIVE

## 2025-09-03 PROCEDURE — 74176 CT ABD & PELVIS W/O CONTRAST: CPT

## 2025-09-03 PROCEDURE — 74176 CT ABD & PELVIS W/O CONTRAST: CPT | Performed by: RADIOLOGY

## 2025-09-03 PROCEDURE — 3008F BODY MASS INDEX DOCD: CPT | Performed by: PHYSICIAN ASSISTANT

## 2025-09-03 PROCEDURE — 99204 OFFICE O/P NEW MOD 45 MIN: CPT | Performed by: PHYSICIAN ASSISTANT

## 2025-09-03 PROCEDURE — 1036F TOBACCO NON-USER: CPT | Performed by: PHYSICIAN ASSISTANT

## 2025-09-03 ASSESSMENT — ENCOUNTER SYMPTOMS
PALPITATIONS: 0
NERVOUS/ANXIOUS: 0
UNEXPECTED WEIGHT CHANGE: 1
NAUSEA: 1
ENDOCRINE NEGATIVE: 1
DEPRESSION: 0
LOSS OF SENSATION IN FEET: 0
ROS GI COMMENTS: BLOATING
HEMATOLOGIC/LYMPHATIC NEGATIVE: 1
RESPIRATORY NEGATIVE: 1
OCCASIONAL FEELINGS OF UNSTEADINESS: 0
HEADACHES: 0
WHEEZING: 0
FREQUENCY: 0
COUGH: 0
VOMITING: 0
BACK PAIN: 0
CONSTITUTIONAL NEGATIVE: 1
ABDOMINAL PAIN: 1
DIARRHEA: 1
SHORTNESS OF BREATH: 0
ARTHRALGIAS: 0
ALLERGIC/IMMUNOLOGIC NEGATIVE: 1
DYSURIA: 0
NEUROLOGICAL NEGATIVE: 1
ABDOMINAL PAIN: 1
MUSCULOSKELETAL NEGATIVE: 1
NAUSEA: 0
PSYCHIATRIC NEGATIVE: 1
FLANK PAIN: 0
EYES NEGATIVE: 1
DIZZINESS: 0
VOMITING: 1

## 2025-09-03 ASSESSMENT — PATIENT HEALTH QUESTIONNAIRE - PHQ9
1. LITTLE INTEREST OR PLEASURE IN DOING THINGS: NOT AT ALL
2. FEELING DOWN, DEPRESSED OR HOPELESS: NOT AT ALL
SUM OF ALL RESPONSES TO PHQ9 QUESTIONS 1 AND 2: 0

## 2025-09-03 ASSESSMENT — PAIN SCALES - GENERAL: PAINLEVEL_OUTOF10: 5

## 2025-09-04 LAB
ALBUMIN SERPL-MCNC: 5.2 G/DL (ref 3.6–5.1)
ALP SERPL-CCNC: 65 U/L (ref 31–125)
ALT SERPL-CCNC: 26 U/L (ref 6–29)
ANION GAP SERPL CALCULATED.4IONS-SCNC: 12 MMOL/L (CALC) (ref 7–17)
AST SERPL-CCNC: 48 U/L (ref 10–35)
BILIRUB SERPL-MCNC: 0.5 MG/DL (ref 0.2–1.2)
BUN SERPL-MCNC: 15 MG/DL (ref 7–25)
CALCIUM SERPL-MCNC: 10.9 MG/DL (ref 8.6–10.2)
CHLORIDE SERPL-SCNC: 96 MMOL/L (ref 98–110)
CO2 SERPL-SCNC: 30 MMOL/L (ref 20–32)
CREAT SERPL-MCNC: 0.77 MG/DL (ref 0.5–0.99)
EGFRCR SERPLBLD CKD-EPI 2021: 95 ML/MIN/1.73M2
ERYTHROCYTE [DISTWIDTH] IN BLOOD BY AUTOMATED COUNT: 11.8 % (ref 11–15)
GLUCOSE SERPL-MCNC: 87 MG/DL (ref 65–99)
HCT VFR BLD AUTO: 43.4 % (ref 35–45)
HGB BLD-MCNC: 14.4 G/DL (ref 11.7–15.5)
MCH RBC QN AUTO: 33 PG (ref 27–33)
MCHC RBC AUTO-ENTMCNC: 33.2 G/DL (ref 32–36)
MCV RBC AUTO: 99.5 FL (ref 80–100)
PLATELET # BLD AUTO: 247 THOUSAND/UL (ref 140–400)
PMV BLD REES-ECKER: 9.8 FL (ref 7.5–12.5)
POTASSIUM SERPL-SCNC: 4.5 MMOL/L (ref 3.5–5.3)
PROT SERPL-MCNC: 7.9 G/DL (ref 6.1–8.1)
RBC # BLD AUTO: 4.36 MILLION/UL (ref 3.8–5.1)
SODIUM SERPL-SCNC: 138 MMOL/L (ref 135–146)
WBC # BLD AUTO: 9.2 THOUSAND/UL (ref 3.8–10.8)

## 2025-09-06 LAB — BACTERIA UR CULT: ABNORMAL

## 2025-09-29 ENCOUNTER — APPOINTMENT (OUTPATIENT)
Dept: GASTROENTEROLOGY | Facility: CLINIC | Age: 50
End: 2025-09-29
Payer: COMMERCIAL

## (undated) DEVICE — SUTURE, MONOCRYL, 4-0, 18 IN, PS2, UNDYED

## (undated) DEVICE — DRILL BIT, CALIBRATED, 2.5MM

## (undated) DEVICE — DRILL BIT, 1.7MM

## (undated) DEVICE — CUFF, TOURNIQUET, 18 X 4, DUAL PORT/SNGL BLADDER, DISP, LF

## (undated) DEVICE — GLOVE, SURGICAL, PROTEXIS PI MICRO, 8.0, PF, LF

## (undated) DEVICE — SUTURE, VICRYL, 2-0, 27 IN, FS-1, UNDYED

## (undated) DEVICE — DRAPE KIT, C-ARM, W/ PLATE & FOOT, DISP

## (undated) DEVICE — CAUTERY, PENCIL, PUSH BUTTON, SMOKE EVAC, 70MM

## (undated) DEVICE — GLOVE, SURGICAL, PROTEXIS PI ORTHO, 8.0, PF, LF

## (undated) DEVICE — Device

## (undated) DEVICE — GUIDEWIRE, 1.35MM, W/ TROCAR TIP